# Patient Record
Sex: FEMALE | Race: WHITE | NOT HISPANIC OR LATINO | Employment: UNEMPLOYED | URBAN - NONMETROPOLITAN AREA
[De-identification: names, ages, dates, MRNs, and addresses within clinical notes are randomized per-mention and may not be internally consistent; named-entity substitution may affect disease eponyms.]

---

## 2022-01-08 ENCOUNTER — HOSPITAL ENCOUNTER (OUTPATIENT)
Facility: HOSPITAL | Age: 37
Setting detail: OBSERVATION
Discharge: HOME | End: 2022-01-11
Attending: EMERGENCY MEDICINE | Admitting: STUDENT IN AN ORGANIZED HEALTH CARE EDUCATION/TRAINING PROGRAM

## 2022-01-08 ENCOUNTER — APPOINTMENT (OUTPATIENT)
Dept: GENERAL RADIOLOGY | Facility: HOSPITAL | Age: 37
End: 2022-01-08

## 2022-01-08 DIAGNOSIS — E10.10 DIABETIC KETOACIDOSIS WITHOUT COMA ASSOCIATED WITH TYPE 1 DIABETES MELLITUS: Primary | ICD-10-CM

## 2022-01-08 LAB
A-A DO2: ABNORMAL
ACETONE BLD QL: ABNORMAL
ALBUMIN SERPL-MCNC: 4.09 G/DL (ref 3.5–5.2)
ALBUMIN/GLOB SERPL: 1.4 G/DL
ALP SERPL-CCNC: 135 U/L (ref 39–117)
ALT SERPL W P-5'-P-CCNC: 52 U/L (ref 1–33)
ANION GAP SERPL CALCULATED.3IONS-SCNC: 25.6 MMOL/L (ref 5–15)
ARTERIAL PATENCY WRIST A: ABNORMAL
AST SERPL-CCNC: 29 U/L (ref 1–32)
ATMOSPHERIC PRESS: 732 MMHG
BASE EXCESS BLDA CALC-SCNC: -26.3 MMOL/L (ref 0–2)
BDY SITE: ABNORMAL
BILIRUB SERPL-MCNC: 0.2 MG/DL (ref 0–1.2)
BODY TEMPERATURE: 0 C
BUN SERPL-MCNC: 16 MG/DL (ref 6–20)
BUN/CREAT SERPL: 14.4 (ref 7–25)
CALCIUM SPEC-SCNC: 8 MG/DL (ref 8.6–10.5)
CHLORIDE SERPL-SCNC: 107 MMOL/L (ref 98–107)
CO2 BLDA-SCNC: 3.7 MMOL/L (ref 22–33)
CO2 SERPL-SCNC: 6.4 MMOL/L (ref 22–29)
COHGB MFR BLD: <0.2 % (ref 0–5)
CREAT SERPL-MCNC: 1.11 MG/DL (ref 0.57–1)
CRP SERPL-MCNC: <0.3 MG/DL (ref 0–0.5)
D DIMER PPP FEU-MCNC: 1.96 MCGFEU/ML (ref 0–0.5)
D-LACTATE SERPL-SCNC: 5.2 MMOL/L (ref 0.5–2)
ETHANOL BLD-MCNC: <10 MG/DL (ref 0–10)
ETHANOL UR QL: <0.01 %
FLUAV RNA RESP QL NAA+PROBE: NOT DETECTED
FLUBV RNA RESP QL NAA+PROBE: NOT DETECTED
GFR SERPL CREATININE-BSD FRML MDRD: 56 ML/MIN/1.73
GLOBULIN UR ELPH-MCNC: 2.9 GM/DL
GLUCOSE BLDC GLUCOMTR-MCNC: 113 MG/DL (ref 70–130)
GLUCOSE BLDC GLUCOMTR-MCNC: 372 MG/DL (ref 70–130)
GLUCOSE SERPL-MCNC: 111 MG/DL (ref 65–99)
HCO3 BLDA-SCNC: 3.3 MMOL/L (ref 20–26)
HCT VFR BLD CALC: 46.5 % (ref 38–51)
HGB BLDA-MCNC: 15.2 G/DL (ref 13.5–17.5)
INHALED O2 CONCENTRATION: 21 %
LIPASE SERPL-CCNC: 50 U/L (ref 13–60)
Lab: ABNORMAL
Lab: ABNORMAL
METHGB BLD QL: 0.3 % (ref 0–3)
MODALITY: ABNORMAL
NOTE: ABNORMAL
NOTIFIED BY: ABNORMAL
NOTIFIED WHO: ABNORMAL
NT-PROBNP SERPL-MCNC: 800.8 PG/ML (ref 0–450)
OXYHGB MFR BLDV: 96.5 % (ref 94–99)
PCO2 BLDA: 13.7 MM HG (ref 35–45)
PCO2 TEMP ADJ BLD: ABNORMAL MM[HG]
PH BLDA: 6.99 PH UNITS (ref 7.35–7.45)
PH, TEMP CORRECTED: ABNORMAL
PO2 BLDA: 136 MM HG (ref 83–108)
PO2 TEMP ADJ BLD: ABNORMAL MM[HG]
POTASSIUM SERPL-SCNC: 4.9 MMOL/L (ref 3.5–5.2)
PROT SERPL-MCNC: 7 G/DL (ref 6–8.5)
SAO2 % BLDCOA: 96.7 % (ref 94–99)
SARS-COV-2 RNA RESP QL NAA+PROBE: NOT DETECTED
SODIUM SERPL-SCNC: 139 MMOL/L (ref 136–145)
TROPONIN T SERPL-MCNC: <0.01 NG/ML (ref 0–0.03)
VENTILATOR MODE: ABNORMAL

## 2022-01-08 PROCEDURE — 93005 ELECTROCARDIOGRAM TRACING: CPT | Performed by: STUDENT IN AN ORGANIZED HEALTH CARE EDUCATION/TRAINING PROGRAM

## 2022-01-08 PROCEDURE — 80053 COMPREHEN METABOLIC PANEL: CPT | Performed by: PHYSICIAN ASSISTANT

## 2022-01-08 PROCEDURE — 36600 WITHDRAWAL OF ARTERIAL BLOOD: CPT

## 2022-01-08 PROCEDURE — 82375 ASSAY CARBOXYHB QUANT: CPT

## 2022-01-08 PROCEDURE — 84484 ASSAY OF TROPONIN QUANT: CPT | Performed by: PHYSICIAN ASSISTANT

## 2022-01-08 PROCEDURE — C9803 HOPD COVID-19 SPEC COLLECT: HCPCS

## 2022-01-08 PROCEDURE — 99284 EMERGENCY DEPT VISIT MOD MDM: CPT

## 2022-01-08 PROCEDURE — 96375 TX/PRO/DX INJ NEW DRUG ADDON: CPT

## 2022-01-08 PROCEDURE — 83605 ASSAY OF LACTIC ACID: CPT | Performed by: PHYSICIAN ASSISTANT

## 2022-01-08 PROCEDURE — G0378 HOSPITAL OBSERVATION PER HR: HCPCS

## 2022-01-08 PROCEDURE — 85379 FIBRIN DEGRADATION QUANT: CPT | Performed by: PHYSICIAN ASSISTANT

## 2022-01-08 PROCEDURE — 25010000002 ONDANSETRON PER 1 MG: Performed by: PHYSICIAN ASSISTANT

## 2022-01-08 PROCEDURE — 82009 KETONE BODYS QUAL: CPT | Performed by: PHYSICIAN ASSISTANT

## 2022-01-08 PROCEDURE — 87040 BLOOD CULTURE FOR BACTERIA: CPT | Performed by: PHYSICIAN ASSISTANT

## 2022-01-08 PROCEDURE — 96367 TX/PROPH/DG ADDL SEQ IV INF: CPT

## 2022-01-08 PROCEDURE — 86140 C-REACTIVE PROTEIN: CPT | Performed by: PHYSICIAN ASSISTANT

## 2022-01-08 PROCEDURE — 87636 SARSCOV2 & INF A&B AMP PRB: CPT | Performed by: PHYSICIAN ASSISTANT

## 2022-01-08 PROCEDURE — 83880 ASSAY OF NATRIURETIC PEPTIDE: CPT | Performed by: PHYSICIAN ASSISTANT

## 2022-01-08 PROCEDURE — 93005 ELECTROCARDIOGRAM TRACING: CPT | Performed by: PHYSICIAN ASSISTANT

## 2022-01-08 PROCEDURE — 25010000002 CEFTRIAXONE PER 250 MG: Performed by: EMERGENCY MEDICINE

## 2022-01-08 PROCEDURE — 83050 HGB METHEMOGLOBIN QUAN: CPT

## 2022-01-08 PROCEDURE — 96365 THER/PROPH/DIAG IV INF INIT: CPT

## 2022-01-08 PROCEDURE — 82077 ASSAY SPEC XCP UR&BREATH IA: CPT | Performed by: PHYSICIAN ASSISTANT

## 2022-01-08 PROCEDURE — 25010000002 KETOROLAC TROMETHAMINE PER 15 MG: Performed by: PHYSICIAN ASSISTANT

## 2022-01-08 PROCEDURE — 93010 ELECTROCARDIOGRAM REPORT: CPT | Performed by: INTERNAL MEDICINE

## 2022-01-08 PROCEDURE — 71045 X-RAY EXAM CHEST 1 VIEW: CPT

## 2022-01-08 PROCEDURE — 82805 BLOOD GASES W/O2 SATURATION: CPT

## 2022-01-08 PROCEDURE — 83690 ASSAY OF LIPASE: CPT | Performed by: PHYSICIAN ASSISTANT

## 2022-01-08 PROCEDURE — 36415 COLL VENOUS BLD VENIPUNCTURE: CPT

## 2022-01-08 PROCEDURE — 84100 ASSAY OF PHOSPHORUS: CPT | Performed by: EMERGENCY MEDICINE

## 2022-01-08 PROCEDURE — 83735 ASSAY OF MAGNESIUM: CPT | Performed by: EMERGENCY MEDICINE

## 2022-01-08 PROCEDURE — 82962 GLUCOSE BLOOD TEST: CPT

## 2022-01-08 RX ORDER — KETOROLAC TROMETHAMINE 30 MG/ML
30 INJECTION, SOLUTION INTRAMUSCULAR; INTRAVENOUS ONCE
Status: COMPLETED | OUTPATIENT
Start: 2022-01-08 | End: 2022-01-08

## 2022-01-08 RX ORDER — SODIUM CHLORIDE 9 MG/ML
30 INJECTION, SOLUTION INTRAVENOUS CONTINUOUS PRN
Status: DISCONTINUED | OUTPATIENT
Start: 2022-01-08 | End: 2022-01-11

## 2022-01-08 RX ORDER — SODIUM CHLORIDE 0.9 % (FLUSH) 0.9 %
30 SYRINGE (ML) INJECTION ONCE AS NEEDED
Status: DISCONTINUED | OUTPATIENT
Start: 2022-01-08 | End: 2022-01-11 | Stop reason: HOSPADM

## 2022-01-08 RX ORDER — SODIUM CHLORIDE 0.9 % (FLUSH) 0.9 %
10 SYRINGE (ML) INJECTION AS NEEDED
Status: DISCONTINUED | OUTPATIENT
Start: 2022-01-08 | End: 2022-01-11 | Stop reason: HOSPADM

## 2022-01-08 RX ORDER — ONDANSETRON 2 MG/ML
4 INJECTION INTRAMUSCULAR; INTRAVENOUS ONCE
Status: COMPLETED | OUTPATIENT
Start: 2022-01-08 | End: 2022-01-08

## 2022-01-08 RX ADMIN — SODIUM CHLORIDE 1000 ML: 9 INJECTION, SOLUTION INTRAVENOUS at 21:22

## 2022-01-08 RX ADMIN — KETOROLAC TROMETHAMINE 30 MG: 30 INJECTION, SOLUTION INTRAMUSCULAR at 21:23

## 2022-01-08 RX ADMIN — CEFTRIAXONE SODIUM 1 G: 1 INJECTION, POWDER, FOR SOLUTION INTRAMUSCULAR; INTRAVENOUS at 23:25

## 2022-01-08 RX ADMIN — SODIUM CHLORIDE 1000 ML: 9 INJECTION, SOLUTION INTRAVENOUS at 22:08

## 2022-01-08 RX ADMIN — INSULIN HUMAN 5 UNITS/HR: 1 INJECTION, SOLUTION INTRAVENOUS at 22:07

## 2022-01-08 RX ADMIN — ONDANSETRON 4 MG: 2 INJECTION INTRAMUSCULAR; INTRAVENOUS at 21:23

## 2022-01-09 ENCOUNTER — APPOINTMENT (OUTPATIENT)
Dept: CT IMAGING | Facility: HOSPITAL | Age: 37
End: 2022-01-09

## 2022-01-09 LAB
ANION GAP SERPL CALCULATED.3IONS-SCNC: 14.3 MMOL/L (ref 5–15)
ANION GAP SERPL CALCULATED.3IONS-SCNC: 15.3 MMOL/L (ref 5–15)
ANION GAP SERPL CALCULATED.3IONS-SCNC: 16.1 MMOL/L (ref 5–15)
ANION GAP SERPL CALCULATED.3IONS-SCNC: 18.9 MMOL/L (ref 5–15)
ANION GAP SERPL CALCULATED.3IONS-SCNC: 21.5 MMOL/L (ref 5–15)
ATMOSPHERIC PRESS: 729 MMHG
ATMOSPHERIC PRESS: 735 MMHG
B-HCG UR QL: NEGATIVE
BACTERIA UR QL AUTO: ABNORMAL /HPF
BASE EXCESS BLDV CALC-SCNC: -12.4 MMOL/L (ref 0–2)
BASE EXCESS BLDV CALC-SCNC: -13.4 MMOL/L (ref 0–2)
BASOPHILS # BLD AUTO: 0.02 10*3/MM3 (ref 0–0.2)
BASOPHILS # BLD AUTO: 0.03 10*3/MM3 (ref 0–0.2)
BASOPHILS NFR BLD AUTO: 0.1 % (ref 0–1.5)
BASOPHILS NFR BLD AUTO: 0.3 % (ref 0–1.5)
BDY SITE: ABNORMAL
BDY SITE: ABNORMAL
BILIRUB UR QL STRIP: NEGATIVE
BODY TEMPERATURE: 37 C
BODY TEMPERATURE: 37 C
BUN SERPL-MCNC: 10 MG/DL (ref 6–20)
BUN SERPL-MCNC: 12 MG/DL (ref 6–20)
BUN SERPL-MCNC: 4 MG/DL (ref 6–20)
BUN SERPL-MCNC: 6 MG/DL (ref 6–20)
BUN SERPL-MCNC: 8 MG/DL (ref 6–20)
BUN/CREAT SERPL: 12.3 (ref 7–25)
BUN/CREAT SERPL: 15.6 (ref 7–25)
BUN/CREAT SERPL: 5.6 (ref 7–25)
BUN/CREAT SERPL: 7 (ref 7–25)
BUN/CREAT SERPL: 8.4 (ref 7–25)
CALCIUM SPEC-SCNC: 6.7 MG/DL (ref 8.6–10.5)
CALCIUM SPEC-SCNC: 7.4 MG/DL (ref 8.6–10.5)
CALCIUM SPEC-SCNC: 7.5 MG/DL (ref 8.6–10.5)
CALCIUM SPEC-SCNC: 7.7 MG/DL (ref 8.6–10.5)
CALCIUM SPEC-SCNC: 7.8 MG/DL (ref 8.6–10.5)
CHLORIDE SERPL-SCNC: 106 MMOL/L (ref 98–107)
CHLORIDE SERPL-SCNC: 111 MMOL/L (ref 98–107)
CHLORIDE SERPL-SCNC: 113 MMOL/L (ref 98–107)
CLARITY UR: CLEAR
CO2 BLDA-SCNC: 14.2 MMOL/L (ref 22–33)
CO2 BLDA-SCNC: 14.3 MMOL/L (ref 22–33)
CO2 SERPL-SCNC: 11.7 MMOL/L (ref 22–29)
CO2 SERPL-SCNC: 11.7 MMOL/L (ref 22–29)
CO2 SERPL-SCNC: 8.5 MMOL/L (ref 22–29)
CO2 SERPL-SCNC: 9.1 MMOL/L (ref 22–29)
CO2 SERPL-SCNC: 9.9 MMOL/L (ref 22–29)
COHGB MFR BLD: 0.4 % (ref 0–5)
COHGB MFR BLD: 1 % (ref 0–5)
COLOR UR: YELLOW
CREAT SERPL-MCNC: 0.71 MG/DL (ref 0.57–1)
CREAT SERPL-MCNC: 0.77 MG/DL (ref 0.57–1)
CREAT SERPL-MCNC: 0.81 MG/DL (ref 0.57–1)
CREAT SERPL-MCNC: 0.86 MG/DL (ref 0.57–1)
CREAT SERPL-MCNC: 0.95 MG/DL (ref 0.57–1)
D-LACTATE SERPL-SCNC: 2.1 MMOL/L (ref 0.5–2)
D-LACTATE SERPL-SCNC: 2.9 MMOL/L (ref 0.5–2)
DEPRECATED RDW RBC AUTO: 52.5 FL (ref 37–54)
DEPRECATED RDW RBC AUTO: 55.9 FL (ref 37–54)
EOSINOPHIL # BLD AUTO: 0 10*3/MM3 (ref 0–0.4)
EOSINOPHIL # BLD AUTO: 0.01 10*3/MM3 (ref 0–0.4)
EOSINOPHIL NFR BLD AUTO: 0 % (ref 0.3–6.2)
EOSINOPHIL NFR BLD AUTO: 0.1 % (ref 0.3–6.2)
ERYTHROCYTE [DISTWIDTH] IN BLOOD BY AUTOMATED COUNT: 14.2 % (ref 12.3–15.4)
ERYTHROCYTE [DISTWIDTH] IN BLOOD BY AUTOMATED COUNT: 14.3 % (ref 12.3–15.4)
GFR SERPL CREATININE-BSD FRML MDRD: 67 ML/MIN/1.73
GFR SERPL CREATININE-BSD FRML MDRD: 75 ML/MIN/1.73
GFR SERPL CREATININE-BSD FRML MDRD: 80 ML/MIN/1.73
GFR SERPL CREATININE-BSD FRML MDRD: 85 ML/MIN/1.73
GFR SERPL CREATININE-BSD FRML MDRD: 94 ML/MIN/1.73
GLUCOSE BLDC GLUCOMTR-MCNC: 105 MG/DL (ref 70–130)
GLUCOSE BLDC GLUCOMTR-MCNC: 117 MG/DL (ref 70–130)
GLUCOSE BLDC GLUCOMTR-MCNC: 125 MG/DL (ref 70–130)
GLUCOSE BLDC GLUCOMTR-MCNC: 128 MG/DL (ref 70–130)
GLUCOSE BLDC GLUCOMTR-MCNC: 128 MG/DL (ref 70–130)
GLUCOSE BLDC GLUCOMTR-MCNC: 137 MG/DL (ref 70–130)
GLUCOSE BLDC GLUCOMTR-MCNC: 154 MG/DL (ref 70–130)
GLUCOSE BLDC GLUCOMTR-MCNC: 155 MG/DL (ref 70–130)
GLUCOSE BLDC GLUCOMTR-MCNC: 165 MG/DL (ref 70–130)
GLUCOSE BLDC GLUCOMTR-MCNC: 166 MG/DL (ref 70–130)
GLUCOSE BLDC GLUCOMTR-MCNC: 170 MG/DL (ref 70–130)
GLUCOSE BLDC GLUCOMTR-MCNC: 175 MG/DL (ref 70–130)
GLUCOSE BLDC GLUCOMTR-MCNC: 221 MG/DL (ref 70–130)
GLUCOSE BLDC GLUCOMTR-MCNC: 246 MG/DL (ref 70–130)
GLUCOSE BLDC GLUCOMTR-MCNC: 253 MG/DL (ref 70–130)
GLUCOSE BLDC GLUCOMTR-MCNC: 280 MG/DL (ref 70–130)
GLUCOSE BLDC GLUCOMTR-MCNC: 281 MG/DL (ref 70–130)
GLUCOSE BLDC GLUCOMTR-MCNC: 283 MG/DL (ref 70–130)
GLUCOSE BLDC GLUCOMTR-MCNC: 289 MG/DL (ref 70–130)
GLUCOSE BLDC GLUCOMTR-MCNC: 64 MG/DL (ref 70–130)
GLUCOSE BLDC GLUCOMTR-MCNC: 86 MG/DL (ref 70–130)
GLUCOSE BLDC GLUCOMTR-MCNC: 89 MG/DL (ref 70–130)
GLUCOSE BLDC GLUCOMTR-MCNC: 95 MG/DL (ref 70–130)
GLUCOSE BLDC GLUCOMTR-MCNC: 98 MG/DL (ref 70–130)
GLUCOSE SERPL-MCNC: 147 MG/DL (ref 65–99)
GLUCOSE SERPL-MCNC: 188 MG/DL (ref 65–99)
GLUCOSE SERPL-MCNC: 221 MG/DL (ref 65–99)
GLUCOSE SERPL-MCNC: 242 MG/DL (ref 65–99)
GLUCOSE SERPL-MCNC: 268 MG/DL (ref 65–99)
GLUCOSE UR STRIP-MCNC: ABNORMAL MG/DL
HCO3 BLDV-SCNC: 13.2 MMOL/L (ref 22–28)
HCO3 BLDV-SCNC: 13.3 MMOL/L (ref 22–28)
HCT VFR BLD AUTO: 34.9 % (ref 34–46.6)
HCT VFR BLD AUTO: 35.9 % (ref 34–46.6)
HGB BLD-MCNC: 11 G/DL (ref 12–15.9)
HGB BLD-MCNC: 11.3 G/DL (ref 12–15.9)
HGB BLDA-MCNC: 11 G/DL (ref 13.5–17.5)
HGB BLDA-MCNC: 11.9 G/DL (ref 13.5–17.5)
HGB UR QL STRIP.AUTO: ABNORMAL
HOLD SPECIMEN: NORMAL
HOLD SPECIMEN: NORMAL
HYALINE CASTS UR QL AUTO: ABNORMAL /LPF
IMM GRANULOCYTES # BLD AUTO: 0.04 10*3/MM3 (ref 0–0.05)
IMM GRANULOCYTES # BLD AUTO: 0.09 10*3/MM3 (ref 0–0.05)
IMM GRANULOCYTES NFR BLD AUTO: 0.4 % (ref 0–0.5)
IMM GRANULOCYTES NFR BLD AUTO: 0.6 % (ref 0–0.5)
INHALED O2 CONCENTRATION: 21 %
INHALED O2 CONCENTRATION: 21 %
KETONES UR QL STRIP: ABNORMAL
LEUKOCYTE ESTERASE UR QL STRIP.AUTO: NEGATIVE
LYMPHOCYTES # BLD AUTO: 1.85 10*3/MM3 (ref 0.7–3.1)
LYMPHOCYTES # BLD AUTO: 2.27 10*3/MM3 (ref 0.7–3.1)
LYMPHOCYTES NFR BLD AUTO: 12.5 % (ref 19.6–45.3)
LYMPHOCYTES NFR BLD AUTO: 21.4 % (ref 19.6–45.3)
Lab: ABNORMAL
MAGNESIUM SERPL-MCNC: 1.5 MG/DL (ref 1.6–2.6)
MAGNESIUM SERPL-MCNC: 1.6 MG/DL (ref 1.6–2.6)
MAGNESIUM SERPL-MCNC: 1.9 MG/DL (ref 1.6–2.6)
MAGNESIUM SERPL-MCNC: 2 MG/DL (ref 1.6–2.6)
MAGNESIUM SERPL-MCNC: 2.1 MG/DL (ref 1.6–2.6)
MAGNESIUM SERPL-MCNC: 2.2 MG/DL (ref 1.6–2.6)
MCH RBC QN AUTO: 32.3 PG (ref 26.6–33)
MCH RBC QN AUTO: 32.4 PG (ref 26.6–33)
MCHC RBC AUTO-ENTMCNC: 30.6 G/DL (ref 31.5–35.7)
MCHC RBC AUTO-ENTMCNC: 32.4 G/DL (ref 31.5–35.7)
MCV RBC AUTO: 105.6 FL (ref 79–97)
MCV RBC AUTO: 99.7 FL (ref 79–97)
METHGB BLD QL: 0.5 % (ref 0–3)
METHGB BLD QL: 0.9 % (ref 0–3)
MODALITY: ABNORMAL
MODALITY: ABNORMAL
MONOCYTES # BLD AUTO: 0.86 10*3/MM3 (ref 0.1–0.9)
MONOCYTES # BLD AUTO: 1.12 10*3/MM3 (ref 0.1–0.9)
MONOCYTES NFR BLD AUTO: 7.6 % (ref 5–12)
MONOCYTES NFR BLD AUTO: 8.1 % (ref 5–12)
NEUTROPHILS NFR BLD AUTO: 11.71 10*3/MM3 (ref 1.7–7)
NEUTROPHILS NFR BLD AUTO: 69.7 % (ref 42.7–76)
NEUTROPHILS NFR BLD AUTO: 7.39 10*3/MM3 (ref 1.7–7)
NEUTROPHILS NFR BLD AUTO: 79.2 % (ref 42.7–76)
NITRITE UR QL STRIP: NEGATIVE
NOTIFIED BY: ABNORMAL
NOTIFIED WHO: ABNORMAL
NRBC BLD AUTO-RTO: 0 /100 WBC (ref 0–0.2)
NRBC BLD AUTO-RTO: 0 /100 WBC (ref 0–0.2)
OXYHGB MFR BLDV: 68.4 % (ref 45–75)
OXYHGB MFR BLDV: 69.7 % (ref 45–75)
PCO2 BLDV: 29.7 MM HG (ref 41–51)
PCO2 BLDV: 32.3 MM HG (ref 41–51)
PH BLDV: 7.22 PH UNITS (ref 7.32–7.42)
PH BLDV: 7.26 PH UNITS (ref 7.32–7.42)
PH UR STRIP.AUTO: <=5 [PH] (ref 5–8)
PHOSPHATE SERPL-MCNC: 1.2 MG/DL (ref 2.5–4.5)
PHOSPHATE SERPL-MCNC: 1.3 MG/DL (ref 2.5–4.5)
PHOSPHATE SERPL-MCNC: 1.4 MG/DL (ref 2.5–4.5)
PHOSPHATE SERPL-MCNC: 1.7 MG/DL (ref 2.5–4.5)
PHOSPHATE SERPL-MCNC: 2.2 MG/DL (ref 2.5–4.5)
PHOSPHATE SERPL-MCNC: 2.7 MG/DL (ref 2.5–4.5)
PLATELET # BLD AUTO: 352 10*3/MM3 (ref 140–450)
PLATELET # BLD AUTO: 362 10*3/MM3 (ref 140–450)
PMV BLD AUTO: 9.4 FL (ref 6–12)
PMV BLD AUTO: 9.7 FL (ref 6–12)
PO2 BLDV: 33.7 MM HG (ref 27–53)
PO2 BLDV: 34.8 MM HG (ref 27–53)
POTASSIUM SERPL-SCNC: 4 MMOL/L (ref 3.5–5.2)
POTASSIUM SERPL-SCNC: 4 MMOL/L (ref 3.5–5.2)
POTASSIUM SERPL-SCNC: 4.1 MMOL/L (ref 3.5–5.2)
POTASSIUM SERPL-SCNC: 4.2 MMOL/L (ref 3.5–5.2)
POTASSIUM SERPL-SCNC: 4.6 MMOL/L (ref 3.5–5.2)
PROT UR QL STRIP: ABNORMAL
RBC # BLD AUTO: 3.4 10*6/MM3 (ref 3.77–5.28)
RBC # BLD AUTO: 3.5 10*6/MM3 (ref 3.77–5.28)
RBC # UR STRIP: ABNORMAL /HPF
REF LAB TEST METHOD: ABNORMAL
SAO2 % BLDCOV: 69.7 % (ref 45–75)
SAO2 % BLDCOV: 70.3 % (ref 45–75)
SODIUM SERPL-SCNC: 136 MMOL/L (ref 136–145)
SODIUM SERPL-SCNC: 137 MMOL/L (ref 136–145)
SODIUM SERPL-SCNC: 138 MMOL/L (ref 136–145)
SODIUM SERPL-SCNC: 139 MMOL/L (ref 136–145)
SODIUM SERPL-SCNC: 139 MMOL/L (ref 136–145)
SP GR UR STRIP: 1.02 (ref 1–1.03)
SQUAMOUS #/AREA URNS HPF: ABNORMAL /HPF
UROBILINOGEN UR QL STRIP: ABNORMAL
VENTILATOR MODE: ABNORMAL
VENTILATOR MODE: ABNORMAL
WBC # UR STRIP: ABNORMAL /HPF
WBC NRBC COR # BLD: 10.6 10*3/MM3 (ref 3.4–10.8)
WBC NRBC COR # BLD: 14.79 10*3/MM3 (ref 3.4–10.8)
WHOLE BLOOD HOLD SPECIMEN: NORMAL
WHOLE BLOOD HOLD SPECIMEN: NORMAL

## 2022-01-09 PROCEDURE — 83735 ASSAY OF MAGNESIUM: CPT | Performed by: EMERGENCY MEDICINE

## 2022-01-09 PROCEDURE — 74177 CT ABD & PELVIS W/CONTRAST: CPT

## 2022-01-09 PROCEDURE — 82820 HEMOGLOBIN-OXYGEN AFFINITY: CPT

## 2022-01-09 PROCEDURE — 82805 BLOOD GASES W/O2 SATURATION: CPT

## 2022-01-09 PROCEDURE — 96366 THER/PROPH/DIAG IV INF ADDON: CPT

## 2022-01-09 PROCEDURE — 71275 CT ANGIOGRAPHY CHEST: CPT

## 2022-01-09 PROCEDURE — 85025 COMPLETE CBC W/AUTO DIFF WBC: CPT | Performed by: PHYSICIAN ASSISTANT

## 2022-01-09 PROCEDURE — 80048 BASIC METABOLIC PNL TOTAL CA: CPT | Performed by: EMERGENCY MEDICINE

## 2022-01-09 PROCEDURE — 82962 GLUCOSE BLOOD TEST: CPT

## 2022-01-09 PROCEDURE — 63710000001 INSULIN DETEMIR PER 5 UNITS: Performed by: EMERGENCY MEDICINE

## 2022-01-09 PROCEDURE — 25010000002 KETOROLAC TROMETHAMINE PER 15 MG: Performed by: EMERGENCY MEDICINE

## 2022-01-09 PROCEDURE — 96376 TX/PRO/DX INJ SAME DRUG ADON: CPT

## 2022-01-09 PROCEDURE — 81001 URINALYSIS AUTO W/SCOPE: CPT | Performed by: PHYSICIAN ASSISTANT

## 2022-01-09 PROCEDURE — 83605 ASSAY OF LACTIC ACID: CPT | Performed by: PHYSICIAN ASSISTANT

## 2022-01-09 PROCEDURE — 25010000002 ONDANSETRON PER 1 MG: Performed by: EMERGENCY MEDICINE

## 2022-01-09 PROCEDURE — 0 IOPAMIDOL PER 1 ML: Performed by: EMERGENCY MEDICINE

## 2022-01-09 PROCEDURE — 25010000002 CEFTRIAXONE PER 250 MG: Performed by: EMERGENCY MEDICINE

## 2022-01-09 PROCEDURE — 84100 ASSAY OF PHOSPHORUS: CPT | Performed by: EMERGENCY MEDICINE

## 2022-01-09 PROCEDURE — 85025 COMPLETE CBC W/AUTO DIFF WBC: CPT | Performed by: EMERGENCY MEDICINE

## 2022-01-09 PROCEDURE — 81025 URINE PREGNANCY TEST: CPT | Performed by: PHYSICIAN ASSISTANT

## 2022-01-09 PROCEDURE — 96368 THER/DIAG CONCURRENT INF: CPT

## 2022-01-09 PROCEDURE — 25010000002 MAGNESIUM SULFATE 2 GM/50ML SOLUTION: Performed by: EMERGENCY MEDICINE

## 2022-01-09 PROCEDURE — 96361 HYDRATE IV INFUSION ADD-ON: CPT

## 2022-01-09 PROCEDURE — 83605 ASSAY OF LACTIC ACID: CPT | Performed by: EMERGENCY MEDICINE

## 2022-01-09 PROCEDURE — 63710000001 INSULIN REGULAR HUMAN PER 5 UNITS: Performed by: EMERGENCY MEDICINE

## 2022-01-09 RX ORDER — SODIUM BICARBONATE 650 MG/1
2600 TABLET ORAL ONCE
Status: COMPLETED | OUTPATIENT
Start: 2022-01-09 | End: 2022-01-09

## 2022-01-09 RX ORDER — DEXTROSE AND SODIUM CHLORIDE 5; .9 G/100ML; G/100ML
200 INJECTION, SOLUTION INTRAVENOUS CONTINUOUS
Status: DISCONTINUED | OUTPATIENT
Start: 2022-01-09 | End: 2022-01-11

## 2022-01-09 RX ORDER — ATORVASTATIN CALCIUM 10 MG/1
10 TABLET, FILM COATED ORAL DAILY
COMMUNITY

## 2022-01-09 RX ORDER — DEXTROSE, SODIUM CHLORIDE, AND POTASSIUM CHLORIDE 5; .45; .15 G/100ML; G/100ML; G/100ML
150 INJECTION INTRAVENOUS CONTINUOUS PRN
Status: DISCONTINUED | OUTPATIENT
Start: 2022-01-09 | End: 2022-01-11

## 2022-01-09 RX ORDER — LEVOTHYROXINE SODIUM 0.12 MG/1
125 TABLET ORAL ONCE
Status: DISCONTINUED | OUTPATIENT
Start: 2022-01-09 | End: 2022-01-09

## 2022-01-09 RX ORDER — SODIUM CHLORIDE 0.9 % (FLUSH) 0.9 %
10 SYRINGE (ML) INJECTION ONCE AS NEEDED
Status: DISCONTINUED | OUTPATIENT
Start: 2022-01-09 | End: 2022-01-11 | Stop reason: HOSPADM

## 2022-01-09 RX ORDER — MAGNESIUM SULFATE HEPTAHYDRATE 40 MG/ML
2 INJECTION, SOLUTION INTRAVENOUS ONCE
Status: COMPLETED | OUTPATIENT
Start: 2022-01-09 | End: 2022-01-09

## 2022-01-09 RX ORDER — SODIUM CHLORIDE 0.9 % (FLUSH) 0.9 %
3 SYRINGE (ML) INJECTION EVERY 12 HOURS SCHEDULED
Status: DISCONTINUED | OUTPATIENT
Start: 2022-01-09 | End: 2022-01-11 | Stop reason: HOSPADM

## 2022-01-09 RX ORDER — KETOROLAC TROMETHAMINE 30 MG/ML
30 INJECTION, SOLUTION INTRAMUSCULAR; INTRAVENOUS ONCE
Status: COMPLETED | OUTPATIENT
Start: 2022-01-09 | End: 2022-01-09

## 2022-01-09 RX ORDER — SODIUM CHLORIDE 9 MG/ML
10 INJECTION, SOLUTION INTRAVENOUS CONTINUOUS PRN
Status: DISCONTINUED | OUTPATIENT
Start: 2022-01-09 | End: 2022-01-11

## 2022-01-09 RX ORDER — DEXTROSE MONOHYDRATE 25 G/50ML
25 INJECTION, SOLUTION INTRAVENOUS ONCE
Status: COMPLETED | OUTPATIENT
Start: 2022-01-09 | End: 2022-01-09

## 2022-01-09 RX ORDER — ONDANSETRON 2 MG/ML
4 INJECTION INTRAMUSCULAR; INTRAVENOUS ONCE
Status: COMPLETED | OUTPATIENT
Start: 2022-01-09 | End: 2022-01-09

## 2022-01-09 RX ORDER — SODIUM CHLORIDE 0.9 % (FLUSH) 0.9 %
10 SYRINGE (ML) INJECTION AS NEEDED
Status: DISCONTINUED | OUTPATIENT
Start: 2022-01-09 | End: 2022-01-11 | Stop reason: HOSPADM

## 2022-01-09 RX ORDER — NICOTINE POLACRILEX 4 MG
15 LOZENGE BUCCAL
Status: DISCONTINUED | OUTPATIENT
Start: 2022-01-09 | End: 2022-01-11 | Stop reason: HOSPADM

## 2022-01-09 RX ORDER — INSULIN GLARGINE 100 [IU]/ML
25 INJECTION, SOLUTION SUBCUTANEOUS DAILY
Status: ON HOLD | COMMUNITY
End: 2022-01-20 | Stop reason: SDUPTHER

## 2022-01-09 RX ORDER — ATORVASTATIN CALCIUM 10 MG/1
10 TABLET, FILM COATED ORAL DAILY
Status: CANCELLED | OUTPATIENT
Start: 2022-01-09

## 2022-01-09 RX ORDER — DEXTROSE MONOHYDRATE 25 G/50ML
25 INJECTION, SOLUTION INTRAVENOUS
Status: DISCONTINUED | OUTPATIENT
Start: 2022-01-09 | End: 2022-01-11 | Stop reason: HOSPADM

## 2022-01-09 RX ORDER — DEXTROSE MONOHYDRATE 25 G/50ML
12.5 INJECTION, SOLUTION INTRAVENOUS AS NEEDED
Status: DISCONTINUED | OUTPATIENT
Start: 2022-01-09 | End: 2022-01-11 | Stop reason: HOSPADM

## 2022-01-09 RX ADMIN — DEXTROSE MONOHYDRATE 12.5 G: 25 INJECTION, SOLUTION INTRAVENOUS at 01:36

## 2022-01-09 RX ADMIN — DEXTROSE MONOHYDRATE 12.5 G: 25 INJECTION, SOLUTION INTRAVENOUS at 07:42

## 2022-01-09 RX ADMIN — INSULIN HUMAN 2 UNITS/HR: 1 INJECTION, SOLUTION INTRAVENOUS at 01:35

## 2022-01-09 RX ADMIN — CEFTRIAXONE 1 G: 1 INJECTION, POWDER, FOR SOLUTION INTRAMUSCULAR; INTRAVENOUS at 08:26

## 2022-01-09 RX ADMIN — MAGNESIUM SULFATE 2 G: 2 INJECTION INTRAVENOUS at 05:51

## 2022-01-09 RX ADMIN — DEXTROSE AND SODIUM CHLORIDE 200 ML/HR: 5; 900 INJECTION, SOLUTION INTRAVENOUS at 18:20

## 2022-01-09 RX ADMIN — DEXTROSE MONOHYDRATE 25 G: 25 INJECTION, SOLUTION INTRAVENOUS at 18:46

## 2022-01-09 RX ADMIN — POTASSIUM CHLORIDE, DEXTROSE MONOHYDRATE AND SODIUM CHLORIDE 150 ML/HR: 150; 5; 450 INJECTION, SOLUTION INTRAVENOUS at 00:57

## 2022-01-09 RX ADMIN — HUMAN INSULIN 3 UNITS: 100 INJECTION, SOLUTION SUBCUTANEOUS at 13:10

## 2022-01-09 RX ADMIN — SODIUM BICARBONATE TAB 650 MG 2600 MG: 650 TAB at 21:47

## 2022-01-09 RX ADMIN — KETOROLAC TROMETHAMINE 30 MG: 30 INJECTION, SOLUTION INTRAMUSCULAR at 04:20

## 2022-01-09 RX ADMIN — DEXTROSE MONOHYDRATE 12.5 G: 25 INJECTION, SOLUTION INTRAVENOUS at 02:32

## 2022-01-09 RX ADMIN — DEXTROSE AND SODIUM CHLORIDE 200 ML/HR: 5; 900 INJECTION, SOLUTION INTRAVENOUS at 13:10

## 2022-01-09 RX ADMIN — SODIUM CHLORIDE 2000 ML: 9 INJECTION, SOLUTION INTRAVENOUS at 00:56

## 2022-01-09 RX ADMIN — IOPAMIDOL 80 ML: 755 INJECTION, SOLUTION INTRAVENOUS at 01:31

## 2022-01-09 RX ADMIN — DEXTROSE MONOHYDRATE 25 G: 25 INJECTION, SOLUTION INTRAVENOUS at 15:20

## 2022-01-09 RX ADMIN — POTASSIUM CHLORIDE, DEXTROSE MONOHYDRATE AND SODIUM CHLORIDE 150 ML/HR: 150; 5; 450 INJECTION, SOLUTION INTRAVENOUS at 09:04

## 2022-01-09 RX ADMIN — ONDANSETRON 4 MG: 2 INJECTION INTRAMUSCULAR; INTRAVENOUS at 04:20

## 2022-01-09 RX ADMIN — INSULIN DETEMIR 15 UNITS: 100 INJECTION, SOLUTION SUBCUTANEOUS at 21:58

## 2022-01-09 RX ADMIN — SODIUM CHLORIDE, PRESERVATIVE FREE 3 ML: 5 INJECTION INTRAVENOUS at 20:15

## 2022-01-09 RX ADMIN — SODIUM PHOSPHATE, MONOBASIC, MONOHYDRATE AND SODIUM PHOSPHATE, DIBASIC, ANHYDROUS 30 MMOL: 276; 142 INJECTION, SOLUTION INTRAVENOUS at 20:14

## 2022-01-10 PROBLEM — E87.20 METABOLIC ACIDOSIS: Status: ACTIVE | Noted: 2022-01-10

## 2022-01-10 LAB
ACETONE BLD QL: NEGATIVE
ANION GAP SERPL CALCULATED.3IONS-SCNC: 12.8 MMOL/L (ref 5–15)
ANION GAP SERPL CALCULATED.3IONS-SCNC: 13.4 MMOL/L (ref 5–15)
ANION GAP SERPL CALCULATED.3IONS-SCNC: 14.5 MMOL/L (ref 5–15)
ATMOSPHERIC PRESS: 737 MMHG
B-HCG UR QL: NEGATIVE
BASE EXCESS BLDV CALC-SCNC: -5.5 MMOL/L (ref 0–2)
BDY SITE: ABNORMAL
BODY TEMPERATURE: 37 C
BUN SERPL-MCNC: 4 MG/DL (ref 6–20)
BUN SERPL-MCNC: 4 MG/DL (ref 6–20)
BUN SERPL-MCNC: 5 MG/DL (ref 6–20)
BUN/CREAT SERPL: 5.1 (ref 7–25)
BUN/CREAT SERPL: 6.1 (ref 7–25)
BUN/CREAT SERPL: 8.1 (ref 7–25)
CALCIUM SPEC-SCNC: 8 MG/DL (ref 8.6–10.5)
CALCIUM SPEC-SCNC: 8.1 MG/DL (ref 8.6–10.5)
CALCIUM SPEC-SCNC: 8.6 MG/DL (ref 8.6–10.5)
CHLORIDE SERPL-SCNC: 105 MMOL/L (ref 98–107)
CHLORIDE SERPL-SCNC: 113 MMOL/L (ref 98–107)
CHLORIDE SERPL-SCNC: 113 MMOL/L (ref 98–107)
CO2 BLDA-SCNC: 22.3 MMOL/L (ref 22–33)
CO2 SERPL-SCNC: 15.2 MMOL/L (ref 22–29)
CO2 SERPL-SCNC: 15.5 MMOL/L (ref 22–29)
CO2 SERPL-SCNC: 15.6 MMOL/L (ref 22–29)
COHGB MFR BLD: 0.9 % (ref 0–5)
CREAT SERPL-MCNC: 0.62 MG/DL (ref 0.57–1)
CREAT SERPL-MCNC: 0.66 MG/DL (ref 0.57–1)
CREAT SERPL-MCNC: 0.78 MG/DL (ref 0.57–1)
GFR SERPL CREATININE-BSD FRML MDRD: 102 ML/MIN/1.73
GFR SERPL CREATININE-BSD FRML MDRD: 110 ML/MIN/1.73
GFR SERPL CREATININE-BSD FRML MDRD: 84 ML/MIN/1.73
GLUCOSE BLDC GLUCOMTR-MCNC: 114 MG/DL (ref 70–130)
GLUCOSE BLDC GLUCOMTR-MCNC: 122 MG/DL (ref 70–130)
GLUCOSE BLDC GLUCOMTR-MCNC: 133 MG/DL (ref 70–130)
GLUCOSE BLDC GLUCOMTR-MCNC: 240 MG/DL (ref 70–130)
GLUCOSE BLDC GLUCOMTR-MCNC: 390 MG/DL (ref 70–130)
GLUCOSE BLDC GLUCOMTR-MCNC: 61 MG/DL (ref 70–130)
GLUCOSE BLDC GLUCOMTR-MCNC: 90 MG/DL (ref 70–130)
GLUCOSE SERPL-MCNC: 135 MG/DL (ref 65–99)
GLUCOSE SERPL-MCNC: 232 MG/DL (ref 65–99)
GLUCOSE SERPL-MCNC: 88 MG/DL (ref 65–99)
HCO3 BLDV-SCNC: 20.9 MMOL/L (ref 22–28)
HGB BLDA-MCNC: 12.7 G/DL (ref 13.5–17.5)
INHALED O2 CONCENTRATION: 21 %
Lab: ABNORMAL
MAGNESIUM SERPL-MCNC: 1.9 MG/DL (ref 1.6–2.6)
MAGNESIUM SERPL-MCNC: 1.9 MG/DL (ref 1.6–2.6)
METHGB BLD QL: 0.4 % (ref 0–3)
MODALITY: ABNORMAL
OXYHGB MFR BLDV: 34.1 % (ref 45–75)
PCO2 BLDV: 43.5 MM HG (ref 41–51)
PH BLDV: 7.29 PH UNITS (ref 7.32–7.42)
PHOSPHATE SERPL-MCNC: 1.8 MG/DL (ref 2.5–4.5)
PHOSPHATE SERPL-MCNC: 2.3 MG/DL (ref 2.5–4.5)
PO2 BLDV: 19.6 MM HG (ref 27–53)
POTASSIUM SERPL-SCNC: 3.4 MMOL/L (ref 3.5–5.2)
POTASSIUM SERPL-SCNC: 3.7 MMOL/L (ref 3.5–5.2)
POTASSIUM SERPL-SCNC: 3.8 MMOL/L (ref 3.5–5.2)
QT INTERVAL: 360 MS
QT INTERVAL: 438 MS
QTC INTERVAL: 471 MS
QTC INTERVAL: 613 MS
SAO2 % BLDCOV: 34.5 % (ref 45–75)
SODIUM SERPL-SCNC: 133 MMOL/L (ref 136–145)
SODIUM SERPL-SCNC: 142 MMOL/L (ref 136–145)
SODIUM SERPL-SCNC: 143 MMOL/L (ref 136–145)
VENTILATOR MODE: ABNORMAL

## 2022-01-10 PROCEDURE — 96366 THER/PROPH/DIAG IV INF ADDON: CPT

## 2022-01-10 PROCEDURE — 84100 ASSAY OF PHOSPHORUS: CPT | Performed by: EMERGENCY MEDICINE

## 2022-01-10 PROCEDURE — 96376 TX/PRO/DX INJ SAME DRUG ADON: CPT

## 2022-01-10 PROCEDURE — 80048 BASIC METABOLIC PNL TOTAL CA: CPT | Performed by: EMERGENCY MEDICINE

## 2022-01-10 PROCEDURE — 82962 GLUCOSE BLOOD TEST: CPT

## 2022-01-10 PROCEDURE — 82805 BLOOD GASES W/O2 SATURATION: CPT

## 2022-01-10 PROCEDURE — 96372 THER/PROPH/DIAG INJ SC/IM: CPT

## 2022-01-10 PROCEDURE — 25010000002 ENOXAPARIN PER 10 MG: Performed by: STUDENT IN AN ORGANIZED HEALTH CARE EDUCATION/TRAINING PROGRAM

## 2022-01-10 PROCEDURE — G0378 HOSPITAL OBSERVATION PER HR: HCPCS

## 2022-01-10 PROCEDURE — 63710000001 INSULIN DETEMIR PER 5 UNITS: Performed by: EMERGENCY MEDICINE

## 2022-01-10 PROCEDURE — 0 MAGNESIUM SULFATE 4 GM/100ML SOLUTION: Performed by: STUDENT IN AN ORGANIZED HEALTH CARE EDUCATION/TRAINING PROGRAM

## 2022-01-10 PROCEDURE — 25010000002 CEFTRIAXONE PER 250 MG: Performed by: STUDENT IN AN ORGANIZED HEALTH CARE EDUCATION/TRAINING PROGRAM

## 2022-01-10 PROCEDURE — 82009 KETONE BODYS QUAL: CPT | Performed by: EMERGENCY MEDICINE

## 2022-01-10 PROCEDURE — 63710000001 INSULIN ASPART PER 5 UNITS: Performed by: EMERGENCY MEDICINE

## 2022-01-10 PROCEDURE — 80048 BASIC METABOLIC PNL TOTAL CA: CPT | Performed by: STUDENT IN AN ORGANIZED HEALTH CARE EDUCATION/TRAINING PROGRAM

## 2022-01-10 PROCEDURE — 94799 UNLISTED PULMONARY SVC/PX: CPT

## 2022-01-10 PROCEDURE — 82820 HEMOGLOBIN-OXYGEN AFFINITY: CPT

## 2022-01-10 PROCEDURE — 83735 ASSAY OF MAGNESIUM: CPT | Performed by: STUDENT IN AN ORGANIZED HEALTH CARE EDUCATION/TRAINING PROGRAM

## 2022-01-10 PROCEDURE — 84100 ASSAY OF PHOSPHORUS: CPT | Performed by: STUDENT IN AN ORGANIZED HEALTH CARE EDUCATION/TRAINING PROGRAM

## 2022-01-10 PROCEDURE — 83735 ASSAY OF MAGNESIUM: CPT | Performed by: EMERGENCY MEDICINE

## 2022-01-10 PROCEDURE — 99220 PR INITIAL OBSERVATION CARE/DAY 70 MINUTES: CPT | Performed by: PHYSICIAN ASSISTANT

## 2022-01-10 PROCEDURE — 81025 URINE PREGNANCY TEST: CPT | Performed by: EMERGENCY MEDICINE

## 2022-01-10 RX ORDER — ONDANSETRON 2 MG/ML
4 INJECTION INTRAMUSCULAR; INTRAVENOUS EVERY 6 HOURS PRN
Status: DISCONTINUED | OUTPATIENT
Start: 2022-01-10 | End: 2022-01-11 | Stop reason: HOSPADM

## 2022-01-10 RX ORDER — MAGNESIUM SULFATE HEPTAHYDRATE 40 MG/ML
4 INJECTION, SOLUTION INTRAVENOUS AS NEEDED
Status: DISCONTINUED | OUTPATIENT
Start: 2022-01-10 | End: 2022-01-11 | Stop reason: HOSPADM

## 2022-01-10 RX ORDER — BISACODYL 5 MG/1
5 TABLET, DELAYED RELEASE ORAL DAILY PRN
Status: DISCONTINUED | OUTPATIENT
Start: 2022-01-10 | End: 2022-01-11 | Stop reason: HOSPADM

## 2022-01-10 RX ORDER — SODIUM CHLORIDE 0.9 % (FLUSH) 0.9 %
10 SYRINGE (ML) INJECTION EVERY 12 HOURS SCHEDULED
Status: DISCONTINUED | OUTPATIENT
Start: 2022-01-10 | End: 2022-01-11 | Stop reason: HOSPADM

## 2022-01-10 RX ORDER — POLYETHYLENE GLYCOL 3350 17 G/17G
17 POWDER, FOR SOLUTION ORAL DAILY PRN
Status: DISCONTINUED | OUTPATIENT
Start: 2022-01-10 | End: 2022-01-11 | Stop reason: HOSPADM

## 2022-01-10 RX ORDER — MAGNESIUM SULFATE HEPTAHYDRATE 40 MG/ML
4 INJECTION, SOLUTION INTRAVENOUS ONCE
Status: COMPLETED | OUTPATIENT
Start: 2022-01-10 | End: 2022-01-10

## 2022-01-10 RX ORDER — POTASSIUM CHLORIDE 1.5 G/1.77G
40 POWDER, FOR SOLUTION ORAL AS NEEDED
Status: DISCONTINUED | OUTPATIENT
Start: 2022-01-10 | End: 2022-01-11

## 2022-01-10 RX ORDER — BISACODYL 10 MG
10 SUPPOSITORY, RECTAL RECTAL DAILY PRN
Status: DISCONTINUED | OUTPATIENT
Start: 2022-01-10 | End: 2022-01-11 | Stop reason: HOSPADM

## 2022-01-10 RX ORDER — NITROGLYCERIN 0.4 MG/1
0.4 TABLET SUBLINGUAL
Status: DISCONTINUED | OUTPATIENT
Start: 2022-01-10 | End: 2022-01-11 | Stop reason: HOSPADM

## 2022-01-10 RX ORDER — MAGNESIUM SULFATE HEPTAHYDRATE 40 MG/ML
2 INJECTION, SOLUTION INTRAVENOUS AS NEEDED
Status: DISCONTINUED | OUTPATIENT
Start: 2022-01-10 | End: 2022-01-11 | Stop reason: HOSPADM

## 2022-01-10 RX ORDER — DEXTROSE MONOHYDRATE 25 G/50ML
25 INJECTION, SOLUTION INTRAVENOUS ONCE
Status: DISCONTINUED | OUTPATIENT
Start: 2022-01-10 | End: 2022-01-10

## 2022-01-10 RX ORDER — AMOXICILLIN 250 MG
2 CAPSULE ORAL 2 TIMES DAILY
Status: DISCONTINUED | OUTPATIENT
Start: 2022-01-10 | End: 2022-01-11 | Stop reason: HOSPADM

## 2022-01-10 RX ORDER — POTASSIUM CHLORIDE 750 MG/1
40 TABLET, FILM COATED, EXTENDED RELEASE ORAL AS NEEDED
Status: DISCONTINUED | OUTPATIENT
Start: 2022-01-10 | End: 2022-01-11

## 2022-01-10 RX ORDER — SODIUM CHLORIDE 0.9 % (FLUSH) 0.9 %
10 SYRINGE (ML) INJECTION AS NEEDED
Status: DISCONTINUED | OUTPATIENT
Start: 2022-01-10 | End: 2022-01-11 | Stop reason: HOSPADM

## 2022-01-10 RX ADMIN — INSULIN ASPART 8 UNITS: 100 INJECTION, SOLUTION INTRAVENOUS; SUBCUTANEOUS at 17:00

## 2022-01-10 RX ADMIN — DOCUSATE SODIUM 50 MG AND SENNOSIDES 8.6 MG 2 TABLET: 8.6; 5 TABLET, FILM COATED ORAL at 20:47

## 2022-01-10 RX ADMIN — CEFTRIAXONE 1 G: 1 INJECTION, POWDER, FOR SOLUTION INTRAMUSCULAR; INTRAVENOUS at 17:00

## 2022-01-10 RX ADMIN — DEXTROSE MONOHYDRATE 25 G: 25 INJECTION, SOLUTION INTRAVENOUS at 05:40

## 2022-01-10 RX ADMIN — INSULIN DETEMIR 15 UNITS: 100 INJECTION, SOLUTION SUBCUTANEOUS at 22:37

## 2022-01-10 RX ADMIN — MAGNESIUM SULFATE HEPTAHYDRATE 4 G: 40 INJECTION, SOLUTION INTRAVENOUS at 17:00

## 2022-01-10 RX ADMIN — POTASSIUM & SODIUM PHOSPHATES POWDER PACK 280-160-250 MG 2 PACKET: 280-160-250 PACK at 17:00

## 2022-01-10 RX ADMIN — ENOXAPARIN SODIUM 40 MG: 40 INJECTION SUBCUTANEOUS at 16:59

## 2022-01-11 VITALS
HEIGHT: 66 IN | BODY MASS INDEX: 27.43 KG/M2 | TEMPERATURE: 97.9 F | HEART RATE: 67 BPM | WEIGHT: 170.7 LBS | DIASTOLIC BLOOD PRESSURE: 79 MMHG | SYSTOLIC BLOOD PRESSURE: 115 MMHG | RESPIRATION RATE: 13 BRPM | OXYGEN SATURATION: 98 %

## 2022-01-11 LAB
ANION GAP SERPL CALCULATED.3IONS-SCNC: 13.9 MMOL/L (ref 5–15)
BUN SERPL-MCNC: 12 MG/DL (ref 6–20)
BUN/CREAT SERPL: 15.2 (ref 7–25)
CALCIUM SPEC-SCNC: 8.1 MG/DL (ref 8.6–10.5)
CHLORIDE SERPL-SCNC: 104 MMOL/L (ref 98–107)
CO2 SERPL-SCNC: 19.1 MMOL/L (ref 22–29)
CREAT SERPL-MCNC: 0.79 MG/DL (ref 0.57–1)
GFR SERPL CREATININE-BSD FRML MDRD: 83 ML/MIN/1.73
GLUCOSE BLDC GLUCOMTR-MCNC: 183 MG/DL (ref 70–130)
GLUCOSE BLDC GLUCOMTR-MCNC: 52 MG/DL (ref 70–130)
GLUCOSE BLDC GLUCOMTR-MCNC: 81 MG/DL (ref 70–130)
GLUCOSE SERPL-MCNC: 325 MG/DL (ref 65–99)
MAGNESIUM SERPL-MCNC: 2.5 MG/DL (ref 1.6–2.6)
PHOSPHATE SERPL-MCNC: 2.5 MG/DL (ref 2.5–4.5)
POTASSIUM SERPL-SCNC: 3.9 MMOL/L (ref 3.5–5.2)
SODIUM SERPL-SCNC: 137 MMOL/L (ref 136–145)

## 2022-01-11 PROCEDURE — 99217 PR OBSERVATION CARE DISCHARGE MANAGEMENT: CPT | Performed by: PHYSICIAN ASSISTANT

## 2022-01-11 PROCEDURE — G0378 HOSPITAL OBSERVATION PER HR: HCPCS

## 2022-01-11 PROCEDURE — 82962 GLUCOSE BLOOD TEST: CPT

## 2022-01-11 PROCEDURE — 63710000001 INSULIN ASPART PER 5 UNITS: Performed by: EMERGENCY MEDICINE

## 2022-01-11 RX ORDER — NITROFURANTOIN 25; 75 MG/1; MG/1
100 CAPSULE ORAL 2 TIMES DAILY
Qty: 6 CAPSULE | Refills: 0 | Status: SHIPPED | OUTPATIENT
Start: 2022-01-11 | End: 2022-01-14

## 2022-01-11 RX ADMIN — SODIUM CHLORIDE, PRESERVATIVE FREE 10 ML: 5 INJECTION INTRAVENOUS at 08:07

## 2022-01-11 RX ADMIN — INSULIN ASPART 2 UNITS: 100 INJECTION, SOLUTION INTRAVENOUS; SUBCUTANEOUS at 10:53

## 2022-01-13 LAB
BACTERIA SPEC AEROBE CULT: NORMAL
BACTERIA SPEC AEROBE CULT: NORMAL

## 2022-01-17 ENCOUNTER — APPOINTMENT (OUTPATIENT)
Dept: GENERAL RADIOLOGY | Facility: HOSPITAL | Age: 37
End: 2022-01-17

## 2022-01-17 ENCOUNTER — HOSPITAL ENCOUNTER (INPATIENT)
Facility: HOSPITAL | Age: 37
LOS: 2 days | Discharge: HOME | End: 2022-01-20
Attending: EMERGENCY MEDICINE | Admitting: INTERNAL MEDICINE

## 2022-01-17 DIAGNOSIS — U07.1 COVID-19: ICD-10-CM

## 2022-01-17 DIAGNOSIS — E83.39 HYPOPHOSPHATEMIA: ICD-10-CM

## 2022-01-17 DIAGNOSIS — E10.10 DIABETIC KETOACIDOSIS WITHOUT COMA ASSOCIATED WITH TYPE 1 DIABETES MELLITUS: Primary | ICD-10-CM

## 2022-01-17 LAB
ACETONE BLD QL: ABNORMAL
ALBUMIN SERPL-MCNC: 4.67 G/DL (ref 3.5–5.2)
ALBUMIN/GLOB SERPL: 1.3 G/DL
ALP SERPL-CCNC: 186 U/L (ref 39–117)
ALT SERPL W P-5'-P-CCNC: 67 U/L (ref 1–33)
ANION GAP SERPL CALCULATED.3IONS-SCNC: 34.5 MMOL/L (ref 5–15)
AST SERPL-CCNC: 84 U/L (ref 1–32)
ATMOSPHERIC PRESS: 724 MMHG
BASE EXCESS BLDV CALC-SCNC: -21.8 MMOL/L (ref 0–2)
BASOPHILS # BLD AUTO: 0.03 10*3/MM3 (ref 0–0.2)
BASOPHILS NFR BLD AUTO: 0.3 % (ref 0–1.5)
BDY SITE: ABNORMAL
BILIRUB SERPL-MCNC: 0.3 MG/DL (ref 0–1.2)
BODY TEMPERATURE: 37 C
BUN SERPL-MCNC: 19 MG/DL (ref 6–20)
BUN/CREAT SERPL: 13.3 (ref 7–25)
CALCIUM SPEC-SCNC: 8.6 MG/DL (ref 8.6–10.5)
CHLORIDE SERPL-SCNC: 89 MMOL/L (ref 98–107)
CO2 BLDA-SCNC: 7.7 MMOL/L (ref 22–33)
CO2 SERPL-SCNC: 5.5 MMOL/L (ref 22–29)
COHGB MFR BLD: 0.8 % (ref 0–5)
CREAT SERPL-MCNC: 1.43 MG/DL (ref 0.57–1)
D-LACTATE SERPL-SCNC: 3.9 MMOL/L (ref 0.5–2)
D-LACTATE SERPL-SCNC: 4.5 MMOL/L (ref 0.5–2)
DEPRECATED RDW RBC AUTO: 53.9 FL (ref 37–54)
EOSINOPHIL # BLD AUTO: 0.01 10*3/MM3 (ref 0–0.4)
EOSINOPHIL NFR BLD AUTO: 0.1 % (ref 0.3–6.2)
ERYTHROCYTE [DISTWIDTH] IN BLOOD BY AUTOMATED COUNT: 14.3 % (ref 12.3–15.4)
ETHANOL BLD-MCNC: <10 MG/DL (ref 0–10)
ETHANOL UR QL: <0.01 %
FLUAV SUBTYP SPEC NAA+PROBE: NOT DETECTED
FLUBV RNA ISLT QL NAA+PROBE: NOT DETECTED
GFR SERPL CREATININE-BSD FRML MDRD: 42 ML/MIN/1.73
GLOBULIN UR ELPH-MCNC: 3.6 GM/DL
GLUCOSE BLDC GLUCOMTR-MCNC: 185 MG/DL (ref 70–130)
GLUCOSE BLDC GLUCOMTR-MCNC: 192 MG/DL (ref 70–130)
GLUCOSE BLDC GLUCOMTR-MCNC: 212 MG/DL (ref 70–130)
GLUCOSE BLDC GLUCOMTR-MCNC: 214 MG/DL (ref 70–130)
GLUCOSE BLDC GLUCOMTR-MCNC: 350 MG/DL (ref 70–130)
GLUCOSE BLDC GLUCOMTR-MCNC: 439 MG/DL (ref 70–130)
GLUCOSE BLDC GLUCOMTR-MCNC: 544 MG/DL (ref 70–130)
GLUCOSE BLDC GLUCOMTR-MCNC: 567 MG/DL (ref 70–130)
GLUCOSE SERPL-MCNC: 555 MG/DL (ref 65–99)
HBA1C MFR BLD: 9 % (ref 4.8–5.6)
HCG SERPL QL: NEGATIVE
HCO3 BLDV-SCNC: 6.9 MMOL/L (ref 22–28)
HCT VFR BLD AUTO: 49.5 % (ref 34–46.6)
HGB BLD-MCNC: 15.5 G/DL (ref 12–15.9)
HGB BLDA-MCNC: 15.4 G/DL (ref 13.5–17.5)
IMM GRANULOCYTES # BLD AUTO: 0.06 10*3/MM3 (ref 0–0.05)
IMM GRANULOCYTES NFR BLD AUTO: 0.5 % (ref 0–0.5)
INHALED O2 CONCENTRATION: 21 %
LYMPHOCYTES # BLD AUTO: 0.84 10*3/MM3 (ref 0.7–3.1)
LYMPHOCYTES NFR BLD AUTO: 7.4 % (ref 19.6–45.3)
Lab: ABNORMAL
Lab: ABNORMAL
MAGNESIUM SERPL-MCNC: 1.9 MG/DL (ref 1.6–2.6)
MCH RBC QN AUTO: 31.5 PG (ref 26.6–33)
MCHC RBC AUTO-ENTMCNC: 31.3 G/DL (ref 31.5–35.7)
MCV RBC AUTO: 100.6 FL (ref 79–97)
METHGB BLD QL: 0.7 % (ref 0–3)
MODALITY: ABNORMAL
MONOCYTES # BLD AUTO: 0.53 10*3/MM3 (ref 0.1–0.9)
MONOCYTES NFR BLD AUTO: 4.6 % (ref 5–12)
NEUTROPHILS NFR BLD AUTO: 87.1 % (ref 42.7–76)
NEUTROPHILS NFR BLD AUTO: 9.94 10*3/MM3 (ref 1.7–7)
NOTIFIED BY: ABNORMAL
NOTIFIED WHO: ABNORMAL
NRBC BLD AUTO-RTO: 0 /100 WBC (ref 0–0.2)
OXYHGB MFR BLDV: 54.4 % (ref 45–75)
PCO2 BLDV: 24.2 MM HG (ref 41–51)
PH BLDV: 7.07 PH UNITS (ref 7.32–7.42)
PHOSPHATE SERPL-MCNC: 3.8 MG/DL (ref 2.5–4.5)
PLATELET # BLD AUTO: 396 10*3/MM3 (ref 140–450)
PMV BLD AUTO: 9.8 FL (ref 6–12)
PO2 BLDV: 34.5 MM HG (ref 27–53)
POTASSIUM SERPL-SCNC: 5 MMOL/L (ref 3.5–5.2)
PROT SERPL-MCNC: 8.3 G/DL (ref 6–8.5)
RBC # BLD AUTO: 4.92 10*6/MM3 (ref 3.77–5.28)
SAO2 % BLDCOV: 55.2 % (ref 45–75)
SARS-COV-2 RNA PNL SPEC NAA+PROBE: DETECTED
SODIUM SERPL-SCNC: 129 MMOL/L (ref 136–145)
TROPONIN T SERPL-MCNC: <0.01 NG/ML (ref 0–0.03)
VENTILATOR MODE: ABNORMAL
WBC NRBC COR # BLD: 11.41 10*3/MM3 (ref 3.4–10.8)

## 2022-01-17 PROCEDURE — 83735 ASSAY OF MAGNESIUM: CPT | Performed by: PHYSICIAN ASSISTANT

## 2022-01-17 PROCEDURE — 84484 ASSAY OF TROPONIN QUANT: CPT | Performed by: PHYSICIAN ASSISTANT

## 2022-01-17 PROCEDURE — 85025 COMPLETE CBC W/AUTO DIFF WBC: CPT | Performed by: PHYSICIAN ASSISTANT

## 2022-01-17 PROCEDURE — 83930 ASSAY OF BLOOD OSMOLALITY: CPT | Performed by: PHYSICIAN ASSISTANT

## 2022-01-17 PROCEDURE — 25010000002 PROCHLORPERAZINE 10 MG/2ML SOLUTION: Performed by: PHYSICIAN ASSISTANT

## 2022-01-17 PROCEDURE — 93005 ELECTROCARDIOGRAM TRACING: CPT | Performed by: PHYSICIAN ASSISTANT

## 2022-01-17 PROCEDURE — 83036 HEMOGLOBIN GLYCOSYLATED A1C: CPT | Performed by: PHYSICIAN ASSISTANT

## 2022-01-17 PROCEDURE — 82009 KETONE BODYS QUAL: CPT | Performed by: PHYSICIAN ASSISTANT

## 2022-01-17 PROCEDURE — 82962 GLUCOSE BLOOD TEST: CPT

## 2022-01-17 PROCEDURE — 99285 EMERGENCY DEPT VISIT HI MDM: CPT

## 2022-01-17 PROCEDURE — 83605 ASSAY OF LACTIC ACID: CPT | Performed by: PHYSICIAN ASSISTANT

## 2022-01-17 PROCEDURE — 71045 X-RAY EXAM CHEST 1 VIEW: CPT | Performed by: RADIOLOGY

## 2022-01-17 PROCEDURE — 82820 HEMOGLOBIN-OXYGEN AFFINITY: CPT

## 2022-01-17 PROCEDURE — 87040 BLOOD CULTURE FOR BACTERIA: CPT | Performed by: PHYSICIAN ASSISTANT

## 2022-01-17 PROCEDURE — 84100 ASSAY OF PHOSPHORUS: CPT | Performed by: PHYSICIAN ASSISTANT

## 2022-01-17 PROCEDURE — 82077 ASSAY SPEC XCP UR&BREATH IA: CPT | Performed by: PHYSICIAN ASSISTANT

## 2022-01-17 PROCEDURE — 82805 BLOOD GASES W/O2 SATURATION: CPT

## 2022-01-17 PROCEDURE — 71045 X-RAY EXAM CHEST 1 VIEW: CPT

## 2022-01-17 PROCEDURE — 87150 DNA/RNA AMPLIFIED PROBE: CPT | Performed by: PHYSICIAN ASSISTANT

## 2022-01-17 PROCEDURE — 84703 CHORIONIC GONADOTROPIN ASSAY: CPT | Performed by: PHYSICIAN ASSISTANT

## 2022-01-17 PROCEDURE — 87147 CULTURE TYPE IMMUNOLOGIC: CPT | Performed by: PHYSICIAN ASSISTANT

## 2022-01-17 PROCEDURE — 80053 COMPREHEN METABOLIC PANEL: CPT | Performed by: PHYSICIAN ASSISTANT

## 2022-01-17 PROCEDURE — 87636 SARSCOV2 & INF A&B AMP PRB: CPT | Performed by: PHYSICIAN ASSISTANT

## 2022-01-17 RX ORDER — DEXTROSE AND SODIUM CHLORIDE 5; .9 G/100ML; G/100ML
150 INJECTION, SOLUTION INTRAVENOUS CONTINUOUS PRN
Status: DISCONTINUED | OUTPATIENT
Start: 2022-01-17 | End: 2022-01-19

## 2022-01-17 RX ORDER — SODIUM CHLORIDE 0.9 % (FLUSH) 0.9 %
10 SYRINGE (ML) INJECTION AS NEEDED
Status: DISCONTINUED | OUTPATIENT
Start: 2022-01-17 | End: 2022-01-20 | Stop reason: HOSPADM

## 2022-01-17 RX ORDER — SODIUM CHLORIDE 9 MG/ML
250 INJECTION, SOLUTION INTRAVENOUS CONTINUOUS PRN
Status: DISCONTINUED | OUTPATIENT
Start: 2022-01-17 | End: 2022-01-19

## 2022-01-17 RX ORDER — PROCHLORPERAZINE EDISYLATE 5 MG/ML
5 INJECTION INTRAMUSCULAR; INTRAVENOUS ONCE
Status: COMPLETED | OUTPATIENT
Start: 2022-01-17 | End: 2022-01-17

## 2022-01-17 RX ORDER — SODIUM CHLORIDE 0.9 % (FLUSH) 0.9 %
3 SYRINGE (ML) INJECTION EVERY 12 HOURS SCHEDULED
Status: DISCONTINUED | OUTPATIENT
Start: 2022-01-17 | End: 2022-01-20 | Stop reason: HOSPADM

## 2022-01-17 RX ORDER — DEXTROSE MONOHYDRATE 25 G/50ML
12.5 INJECTION, SOLUTION INTRAVENOUS AS NEEDED
Status: DISCONTINUED | OUTPATIENT
Start: 2022-01-17 | End: 2022-01-20 | Stop reason: HOSPADM

## 2022-01-17 RX ORDER — DEXTROSE AND SODIUM CHLORIDE 5; .45 G/100ML; G/100ML
150 INJECTION, SOLUTION INTRAVENOUS CONTINUOUS PRN
Status: DISCONTINUED | OUTPATIENT
Start: 2022-01-17 | End: 2022-01-19

## 2022-01-17 RX ORDER — SODIUM CHLORIDE AND POTASSIUM CHLORIDE 150; 900 MG/100ML; MG/100ML
250 INJECTION, SOLUTION INTRAVENOUS CONTINUOUS PRN
Status: DISCONTINUED | OUTPATIENT
Start: 2022-01-17 | End: 2022-01-19

## 2022-01-17 RX ORDER — SODIUM CHLORIDE 450 MG/100ML
250 INJECTION, SOLUTION INTRAVENOUS CONTINUOUS PRN
Status: DISCONTINUED | OUTPATIENT
Start: 2022-01-17 | End: 2022-01-19

## 2022-01-17 RX ORDER — SODIUM CHLORIDE AND POTASSIUM CHLORIDE 150; 450 MG/100ML; MG/100ML
250 INJECTION, SOLUTION INTRAVENOUS CONTINUOUS PRN
Status: DISCONTINUED | OUTPATIENT
Start: 2022-01-17 | End: 2022-01-19

## 2022-01-17 RX ORDER — SODIUM CHLORIDE 9 MG/ML
10 INJECTION, SOLUTION INTRAVENOUS CONTINUOUS PRN
Status: DISCONTINUED | OUTPATIENT
Start: 2022-01-17 | End: 2022-01-19

## 2022-01-17 RX ORDER — SODIUM CHLORIDE AND POTASSIUM CHLORIDE 300; 900 MG/100ML; MG/100ML
250 INJECTION, SOLUTION INTRAVENOUS CONTINUOUS PRN
Status: DISCONTINUED | OUTPATIENT
Start: 2022-01-17 | End: 2022-01-19

## 2022-01-17 RX ORDER — DEXTROSE, SODIUM CHLORIDE, AND POTASSIUM CHLORIDE 5; .45; .15 G/100ML; G/100ML; G/100ML
150 INJECTION INTRAVENOUS CONTINUOUS PRN
Status: DISCONTINUED | OUTPATIENT
Start: 2022-01-17 | End: 2022-01-19

## 2022-01-17 RX ORDER — POTASSIUM CHLORIDE, DEXTROSE MONOHYDRATE AND SODIUM CHLORIDE 300; 5; 900 MG/100ML; G/100ML; MG/100ML
150 INJECTION, SOLUTION INTRAVENOUS CONTINUOUS PRN
Status: DISCONTINUED | OUTPATIENT
Start: 2022-01-17 | End: 2022-01-19

## 2022-01-17 RX ORDER — SODIUM CHLORIDE 0.9 % (FLUSH) 0.9 %
10 SYRINGE (ML) INJECTION ONCE AS NEEDED
Status: DISCONTINUED | OUTPATIENT
Start: 2022-01-17 | End: 2022-01-20 | Stop reason: HOSPADM

## 2022-01-17 RX ORDER — DEXTROSE, SODIUM CHLORIDE, AND POTASSIUM CHLORIDE 5; .45; .15 G/100ML; G/100ML; G/100ML
150 INJECTION INTRAVENOUS CONTINUOUS
Status: DISCONTINUED | OUTPATIENT
Start: 2022-01-17 | End: 2022-01-17

## 2022-01-17 RX ORDER — DEXTROSE, SODIUM CHLORIDE, AND POTASSIUM CHLORIDE 5; .9; .15 G/100ML; G/100ML; G/100ML
150 INJECTION INTRAVENOUS CONTINUOUS PRN
Status: DISCONTINUED | OUTPATIENT
Start: 2022-01-17 | End: 2022-01-19

## 2022-01-17 RX ORDER — DEXTROSE, SODIUM CHLORIDE, AND POTASSIUM CHLORIDE 5; .45; .3 G/100ML; G/100ML; G/100ML
150 INJECTION INTRAVENOUS CONTINUOUS PRN
Status: DISCONTINUED | OUTPATIENT
Start: 2022-01-17 | End: 2022-01-19

## 2022-01-17 RX ADMIN — PROCHLORPERAZINE EDISYLATE 5 MG: 5 INJECTION INTRAMUSCULAR; INTRAVENOUS at 18:40

## 2022-01-17 RX ADMIN — PROCHLORPERAZINE EDISYLATE 5 MG: 5 INJECTION INTRAMUSCULAR; INTRAVENOUS at 16:27

## 2022-01-17 RX ADMIN — POTASSIUM CHLORIDE, DEXTROSE MONOHYDRATE AND SODIUM CHLORIDE 150 ML/HR: 300; 5; 450 INJECTION, SOLUTION INTRAVENOUS at 22:21

## 2022-01-17 RX ADMIN — SODIUM CHLORIDE 2000 ML: 9 INJECTION, SOLUTION INTRAVENOUS at 16:11

## 2022-01-17 RX ADMIN — INSULIN HUMAN 7 UNITS/HR: 1 INJECTION, SOLUTION INTRAVENOUS at 16:52

## 2022-01-17 NOTE — ED PROVIDER NOTES
Subjective   36-year-old white female presents secondary to weakness along with nausea vomiting and elevated glucose.  Patient recently had DKA.  She got out of the hospital on Tuesday.  She states she started having nausea vomiting and weakness on Saturday.  This is progressively worsened.  She denies any chest pain pressure tightness or squeezing.  She states she feels short of breath.  She denies any exposure to COVID or COVID symptoms otherwise.  Her last menstrual cycle was last Saturday.  She voices no other complaints this time.          Review of Systems   Constitutional: Negative.  Negative for fever.   HENT: Negative.    Respiratory: Negative.    Cardiovascular: Negative.  Negative for chest pain.   Gastrointestinal: Negative.  Negative for abdominal pain.   Endocrine: Negative.    Genitourinary: Negative.  Negative for dysuria.   Skin: Negative.    Neurological: Negative.    Psychiatric/Behavioral: Negative.    All other systems reviewed and are negative.      Past Medical History:   Diagnosis Date   • Diabetes (HCC)        No Known Allergies    Past Surgical History:   Procedure Laterality Date   • TONSILLECTOMY         Family History   Problem Relation Age of Onset   • Diabetes Mother    • Heart disease Paternal Grandmother        Social History     Socioeconomic History   • Marital status: Single   Tobacco Use   • Smoking status: Never Smoker   • Smokeless tobacco: Never Used   Substance and Sexual Activity   • Alcohol use: Not Currently           Objective   Physical Exam  Vitals and nursing note reviewed.   Constitutional:       General: She is not in acute distress.     Appearance: She is well-developed. She is not diaphoretic.      Comments: Kussmaul breathing, pale.  Appears ill.   HENT:      Head: Normocephalic and atraumatic.      Right Ear: External ear normal.      Left Ear: External ear normal.      Nose: Nose normal.   Eyes:      Conjunctiva/sclera: Conjunctivae normal.      Pupils: Pupils  are equal, round, and reactive to light.   Neck:      Vascular: No JVD.      Trachea: No tracheal deviation.   Cardiovascular:      Rate and Rhythm: Normal rate and regular rhythm.      Heart sounds: Normal heart sounds. No murmur heard.      Pulmonary:      Effort: Pulmonary effort is normal. No respiratory distress.      Breath sounds: Normal breath sounds. No wheezing.   Abdominal:      General: Bowel sounds are normal.      Palpations: Abdomen is soft.      Tenderness: There is no abdominal tenderness.   Musculoskeletal:         General: No deformity. Normal range of motion.      Cervical back: Normal range of motion and neck supple.   Skin:     General: Skin is warm and dry.      Coloration: Skin is not pale.      Findings: No erythema or rash.   Neurological:      Mental Status: She is alert and oriented to person, place, and time.      Cranial Nerves: No cranial nerve deficit.   Psychiatric:         Behavior: Behavior normal.         Thought Content: Thought content normal.         Procedures           ED Course  ED Course as of 01/18/22 1633   Mon Jan 17, 2022   1654 Unfortunately due to winter weather along with pandemic there are no options of transfer.  There are also no beds in this facility.  We will treat patient here in the ER until a bed becomes available. [JI]   1859 ECG 12 Lead  Sinus tachycardia.  Rate 134.  Normal axis.  Normal QT interval.  Right atrial enlargement.  Baseline artifact with no definite acute ischemic changes.  Abnormal EKG.  Interpreted by me. [BC]   2000 Signed out to Dr Brian [JI]   Tue Jan 18, 2022   0607 XR Chest 1 View  IMPRESSION:    Unremarkable exam. No acute cardiopulmonary findings identified. [ES]   0848 Resumed care at shift change.  Patient resting comfortably.  Labs are improving.  Disc obtained patient's repeat BMP and her gap is closed.  We will stop her insulin drip. [JI]   1991 Patient was accepted in [place].  To Dr. Woody [JI]   6303 Called a preliminary  blood culture, patient has staph not aureus.  We will continue to follow. [JI]   8191 Patient was accepted by Dr. Jones [JI]      ED Course User Index  [BC] North Hannah MD  [ES] Jaya Brian MD  [JI] Charles Everett PA                                                 MDM  Number of Diagnoses or Management Options  COVID-19: new and requires workup  Diabetic ketoacidosis without coma associated with type 1 diabetes mellitus (HCC): new and requires workup  Hypophosphatemia: new and requires workup     Amount and/or Complexity of Data Reviewed  Clinical lab tests: reviewed and ordered  Tests in the radiology section of CPT®: reviewed and ordered  Tests in the medicine section of CPT®: reviewed and ordered  Independent visualization of images, tracings, or specimens: yes        Final diagnoses:   Diabetic ketoacidosis without coma associated with type 1 diabetes mellitus (HCC)   Hypophosphatemia   COVID-19       ED Disposition  ED Disposition     ED Disposition Condition Comment    Decision to Admit  Level of Care: Progressive Care [20]   Diagnosis: DKA (diabetic ketoacidosis) (HCC) [308939]   Admitting Physician: RIGO JONES [[id number]]   Attending Physician: RIGO JONES [[id number]]   Certification: I Certify That Inpatient Hospital Services Are Medically Necessary For Greater Than 2 Midnights            No follow-up provider specified.       Medication List      No changes were made to your prescriptions during this visit.          Charles Everett PA  01/18/22 3534

## 2022-01-18 ENCOUNTER — APPOINTMENT (OUTPATIENT)
Dept: CT IMAGING | Facility: HOSPITAL | Age: 37
End: 2022-01-18

## 2022-01-18 PROBLEM — E11.10 DKA (DIABETIC KETOACIDOSIS): Status: ACTIVE | Noted: 2022-01-18

## 2022-01-18 LAB
ACETONE BLD QL: ABNORMAL
ACETONE BLD QL: NEGATIVE
ANION GAP SERPL CALCULATED.3IONS-SCNC: 13.4 MMOL/L (ref 5–15)
ANION GAP SERPL CALCULATED.3IONS-SCNC: 15.5 MMOL/L (ref 5–15)
ANION GAP SERPL CALCULATED.3IONS-SCNC: 16.2 MMOL/L (ref 5–15)
ANION GAP SERPL CALCULATED.3IONS-SCNC: 16.4 MMOL/L (ref 5–15)
ANION GAP SERPL CALCULATED.3IONS-SCNC: 17 MMOL/L (ref 5–15)
ANION GAP SERPL CALCULATED.3IONS-SCNC: 18.2 MMOL/L (ref 5–15)
ATMOSPHERIC PRESS: 730 MMHG
BACTERIA BLD CULT: ABNORMAL
BASE EXCESS BLDV CALC-SCNC: -5 MMOL/L (ref 0–2)
BASOPHILS # BLD AUTO: 0.01 10*3/MM3 (ref 0–0.2)
BASOPHILS NFR BLD AUTO: 0.1 % (ref 0–1.5)
BDY SITE: ABNORMAL
BODY TEMPERATURE: 37 C
BOTTLE TYPE: ABNORMAL
BUN SERPL-MCNC: 10 MG/DL (ref 6–20)
BUN SERPL-MCNC: 11 MG/DL (ref 6–20)
BUN SERPL-MCNC: 11 MG/DL (ref 6–20)
BUN/CREAT SERPL: 11 (ref 7–25)
BUN/CREAT SERPL: 11.8 (ref 7–25)
BUN/CREAT SERPL: 12.3 (ref 7–25)
BUN/CREAT SERPL: 12.3 (ref 7–25)
BUN/CREAT SERPL: 12.4 (ref 7–25)
BUN/CREAT SERPL: 13.3 (ref 7–25)
CALCIUM SPEC-SCNC: 8.2 MG/DL (ref 8.6–10.5)
CALCIUM SPEC-SCNC: 8.2 MG/DL (ref 8.6–10.5)
CALCIUM SPEC-SCNC: 8.3 MG/DL (ref 8.6–10.5)
CALCIUM SPEC-SCNC: 8.4 MG/DL (ref 8.6–10.5)
CALCIUM SPEC-SCNC: 8.4 MG/DL (ref 8.6–10.5)
CALCIUM SPEC-SCNC: 8.8 MG/DL (ref 8.6–10.5)
CHLORIDE SERPL-SCNC: 101 MMOL/L (ref 98–107)
CHLORIDE SERPL-SCNC: 102 MMOL/L (ref 98–107)
CHLORIDE SERPL-SCNC: 103 MMOL/L (ref 98–107)
CO2 BLDA-SCNC: 21.1 MMOL/L (ref 22–33)
CO2 SERPL-SCNC: 13.8 MMOL/L (ref 22–29)
CO2 SERPL-SCNC: 14 MMOL/L (ref 22–29)
CO2 SERPL-SCNC: 15.5 MMOL/L (ref 22–29)
CO2 SERPL-SCNC: 15.6 MMOL/L (ref 22–29)
CO2 SERPL-SCNC: 15.6 MMOL/L (ref 22–29)
CO2 SERPL-SCNC: 16.8 MMOL/L (ref 22–29)
COHGB MFR BLD: 0.4 % (ref 0–5)
CREAT SERPL-MCNC: 0.81 MG/DL (ref 0.57–1)
CREAT SERPL-MCNC: 0.81 MG/DL (ref 0.57–1)
CREAT SERPL-MCNC: 0.83 MG/DL (ref 0.57–1)
CREAT SERPL-MCNC: 0.85 MG/DL (ref 0.57–1)
CREAT SERPL-MCNC: 0.89 MG/DL (ref 0.57–1)
CREAT SERPL-MCNC: 0.91 MG/DL (ref 0.57–1)
D DIMER PPP FEU-MCNC: 1.98 MCGFEU/ML (ref 0–0.5)
D-LACTATE SERPL-SCNC: 1.1 MMOL/L (ref 0.5–2)
DEPRECATED RDW RBC AUTO: 48.8 FL (ref 37–54)
EOSINOPHIL # BLD AUTO: 0 10*3/MM3 (ref 0–0.4)
EOSINOPHIL NFR BLD AUTO: 0 % (ref 0.3–6.2)
ERYTHROCYTE [DISTWIDTH] IN BLOOD BY AUTOMATED COUNT: 14 % (ref 12.3–15.4)
GFR SERPL CREATININE-BSD FRML MDRD: 70 ML/MIN/1.73
GFR SERPL CREATININE-BSD FRML MDRD: 72 ML/MIN/1.73
GFR SERPL CREATININE-BSD FRML MDRD: 76 ML/MIN/1.73
GFR SERPL CREATININE-BSD FRML MDRD: 78 ML/MIN/1.73
GFR SERPL CREATININE-BSD FRML MDRD: 80 ML/MIN/1.73
GFR SERPL CREATININE-BSD FRML MDRD: 80 ML/MIN/1.73
GLUCOSE BLDC GLUCOMTR-MCNC: 114 MG/DL (ref 70–130)
GLUCOSE BLDC GLUCOMTR-MCNC: 116 MG/DL (ref 70–130)
GLUCOSE BLDC GLUCOMTR-MCNC: 122 MG/DL (ref 70–130)
GLUCOSE BLDC GLUCOMTR-MCNC: 130 MG/DL (ref 70–130)
GLUCOSE BLDC GLUCOMTR-MCNC: 133 MG/DL (ref 70–130)
GLUCOSE BLDC GLUCOMTR-MCNC: 136 MG/DL (ref 70–130)
GLUCOSE BLDC GLUCOMTR-MCNC: 141 MG/DL (ref 70–130)
GLUCOSE BLDC GLUCOMTR-MCNC: 159 MG/DL (ref 70–130)
GLUCOSE BLDC GLUCOMTR-MCNC: 167 MG/DL (ref 70–130)
GLUCOSE BLDC GLUCOMTR-MCNC: 171 MG/DL (ref 70–130)
GLUCOSE BLDC GLUCOMTR-MCNC: 188 MG/DL (ref 70–130)
GLUCOSE BLDC GLUCOMTR-MCNC: 201 MG/DL (ref 70–130)
GLUCOSE BLDC GLUCOMTR-MCNC: 209 MG/DL (ref 70–130)
GLUCOSE BLDC GLUCOMTR-MCNC: 216 MG/DL (ref 70–130)
GLUCOSE BLDC GLUCOMTR-MCNC: 244 MG/DL (ref 70–130)
GLUCOSE BLDC GLUCOMTR-MCNC: 247 MG/DL (ref 70–130)
GLUCOSE BLDC GLUCOMTR-MCNC: 252 MG/DL (ref 70–130)
GLUCOSE BLDC GLUCOMTR-MCNC: 264 MG/DL (ref 70–130)
GLUCOSE BLDC GLUCOMTR-MCNC: 57 MG/DL (ref 70–130)
GLUCOSE BLDC GLUCOMTR-MCNC: 74 MG/DL (ref 70–130)
GLUCOSE BLDC GLUCOMTR-MCNC: 77 MG/DL (ref 70–130)
GLUCOSE BLDC GLUCOMTR-MCNC: 89 MG/DL (ref 70–130)
GLUCOSE SERPL-MCNC: 115 MG/DL (ref 65–99)
GLUCOSE SERPL-MCNC: 158 MG/DL (ref 65–99)
GLUCOSE SERPL-MCNC: 169 MG/DL (ref 65–99)
GLUCOSE SERPL-MCNC: 229 MG/DL (ref 65–99)
GLUCOSE SERPL-MCNC: 277 MG/DL (ref 65–99)
GLUCOSE SERPL-MCNC: 73 MG/DL (ref 65–99)
HCO3 BLDV-SCNC: 20 MMOL/L (ref 22–28)
HCT VFR BLD AUTO: 40.2 % (ref 34–46.6)
HGB BLD-MCNC: 13.6 G/DL (ref 12–15.9)
HGB BLDA-MCNC: 13.9 G/DL (ref 13.5–17.5)
IMM GRANULOCYTES # BLD AUTO: 0.02 10*3/MM3 (ref 0–0.05)
IMM GRANULOCYTES NFR BLD AUTO: 0.3 % (ref 0–0.5)
INHALED O2 CONCENTRATION: 21 %
LYMPHOCYTES # BLD AUTO: 1.44 10*3/MM3 (ref 0.7–3.1)
LYMPHOCYTES NFR BLD AUTO: 19.8 % (ref 19.6–45.3)
Lab: ABNORMAL
MAGNESIUM SERPL-MCNC: 1.6 MG/DL (ref 1.6–2.6)
MAGNESIUM SERPL-MCNC: 1.7 MG/DL (ref 1.6–2.6)
MAGNESIUM SERPL-MCNC: 1.7 MG/DL (ref 1.6–2.6)
MAGNESIUM SERPL-MCNC: 1.8 MG/DL (ref 1.6–2.6)
MAGNESIUM SERPL-MCNC: 1.8 MG/DL (ref 1.6–2.6)
MCH RBC QN AUTO: 31.9 PG (ref 26.6–33)
MCHC RBC AUTO-ENTMCNC: 33.8 G/DL (ref 31.5–35.7)
MCV RBC AUTO: 94.1 FL (ref 79–97)
METHGB BLD QL: 0.3 % (ref 0–3)
MODALITY: ABNORMAL
MONOCYTES # BLD AUTO: 1.3 10*3/MM3 (ref 0.1–0.9)
MONOCYTES NFR BLD AUTO: 17.9 % (ref 5–12)
NEUTROPHILS NFR BLD AUTO: 4.49 10*3/MM3 (ref 1.7–7)
NEUTROPHILS NFR BLD AUTO: 61.9 % (ref 42.7–76)
NRBC BLD AUTO-RTO: 0 /100 WBC (ref 0–0.2)
OSMOLALITY SERPL: 329 MOSM/KG (ref 275–300)
OXYHGB MFR BLDV: 72.3 % (ref 45–75)
PCO2 BLDV: 36.2 MM HG (ref 41–51)
PH BLDV: 7.35 PH UNITS (ref 7.32–7.42)
PHOSPHATE SERPL-MCNC: 1.1 MG/DL (ref 2.5–4.5)
PHOSPHATE SERPL-MCNC: 1.2 MG/DL (ref 2.5–4.5)
PHOSPHATE SERPL-MCNC: 1.8 MG/DL (ref 2.5–4.5)
PHOSPHATE SERPL-MCNC: 1.8 MG/DL (ref 2.5–4.5)
PHOSPHATE SERPL-MCNC: 2.3 MG/DL (ref 2.5–4.5)
PLATELET # BLD AUTO: 306 10*3/MM3 (ref 140–450)
PMV BLD AUTO: 8.9 FL (ref 6–12)
PO2 BLDV: 35.4 MM HG (ref 27–53)
POTASSIUM SERPL-SCNC: 3.1 MMOL/L (ref 3.5–5.2)
POTASSIUM SERPL-SCNC: 3.7 MMOL/L (ref 3.5–5.2)
POTASSIUM SERPL-SCNC: 3.7 MMOL/L (ref 3.5–5.2)
POTASSIUM SERPL-SCNC: 3.9 MMOL/L (ref 3.5–5.2)
POTASSIUM SERPL-SCNC: 3.9 MMOL/L (ref 3.5–5.2)
POTASSIUM SERPL-SCNC: 4.1 MMOL/L (ref 3.5–5.2)
RBC # BLD AUTO: 4.27 10*6/MM3 (ref 3.77–5.28)
SAO2 % BLDCOV: 72.8 % (ref 45–75)
SODIUM SERPL-SCNC: 132 MMOL/L (ref 136–145)
SODIUM SERPL-SCNC: 133 MMOL/L (ref 136–145)
SODIUM SERPL-SCNC: 136 MMOL/L (ref 136–145)
VENTILATOR MODE: ABNORMAL
WBC NRBC COR # BLD: 7.26 10*3/MM3 (ref 3.4–10.8)

## 2022-01-18 PROCEDURE — 80048 BASIC METABOLIC PNL TOTAL CA: CPT | Performed by: EMERGENCY MEDICINE

## 2022-01-18 PROCEDURE — 84100 ASSAY OF PHOSPHORUS: CPT | Performed by: PHYSICIAN ASSISTANT

## 2022-01-18 PROCEDURE — 63710000001 INSULIN ASPART PER 5 UNITS: Performed by: PHYSICIAN ASSISTANT

## 2022-01-18 PROCEDURE — 63710000001 INSULIN DETEMIR PER 5 UNITS: Performed by: PHYSICIAN ASSISTANT

## 2022-01-18 PROCEDURE — 80048 BASIC METABOLIC PNL TOTAL CA: CPT | Performed by: PHYSICIAN ASSISTANT

## 2022-01-18 PROCEDURE — 84100 ASSAY OF PHOSPHORUS: CPT | Performed by: INTERNAL MEDICINE

## 2022-01-18 PROCEDURE — 82962 GLUCOSE BLOOD TEST: CPT

## 2022-01-18 PROCEDURE — 83735 ASSAY OF MAGNESIUM: CPT | Performed by: INTERNAL MEDICINE

## 2022-01-18 PROCEDURE — 25010000002 ONDANSETRON PER 1 MG: Performed by: PHYSICIAN ASSISTANT

## 2022-01-18 PROCEDURE — 36415 COLL VENOUS BLD VENIPUNCTURE: CPT

## 2022-01-18 PROCEDURE — 82009 KETONE BODYS QUAL: CPT | Performed by: PHYSICIAN ASSISTANT

## 2022-01-18 PROCEDURE — 83735 ASSAY OF MAGNESIUM: CPT | Performed by: PHYSICIAN ASSISTANT

## 2022-01-18 PROCEDURE — 85379 FIBRIN DEGRADATION QUANT: CPT | Performed by: INTERNAL MEDICINE

## 2022-01-18 PROCEDURE — 82805 BLOOD GASES W/O2 SATURATION: CPT

## 2022-01-18 PROCEDURE — 71275 CT ANGIOGRAPHY CHEST: CPT

## 2022-01-18 PROCEDURE — 0 IOPAMIDOL PER 1 ML: Performed by: INTERNAL MEDICINE

## 2022-01-18 PROCEDURE — 82820 HEMOGLOBIN-OXYGEN AFFINITY: CPT

## 2022-01-18 PROCEDURE — 80048 BASIC METABOLIC PNL TOTAL CA: CPT | Performed by: INTERNAL MEDICINE

## 2022-01-18 PROCEDURE — 99223 1ST HOSP IP/OBS HIGH 75: CPT | Performed by: INTERNAL MEDICINE

## 2022-01-18 PROCEDURE — 85025 COMPLETE CBC W/AUTO DIFF WBC: CPT | Performed by: EMERGENCY MEDICINE

## 2022-01-18 PROCEDURE — 83605 ASSAY OF LACTIC ACID: CPT | Performed by: EMERGENCY MEDICINE

## 2022-01-18 RX ORDER — DEXTROSE MONOHYDRATE 25 G/50ML
25 INJECTION, SOLUTION INTRAVENOUS ONCE
Status: COMPLETED | OUTPATIENT
Start: 2022-01-18 | End: 2022-01-18

## 2022-01-18 RX ORDER — SODIUM CHLORIDE 0.9 % (FLUSH) 0.9 %
10 SYRINGE (ML) INJECTION EVERY 12 HOURS SCHEDULED
Status: DISCONTINUED | OUTPATIENT
Start: 2022-01-18 | End: 2022-01-20 | Stop reason: HOSPADM

## 2022-01-18 RX ORDER — ATORVASTATIN CALCIUM 10 MG/1
10 TABLET, FILM COATED ORAL DAILY
Status: DISCONTINUED | OUTPATIENT
Start: 2022-01-18 | End: 2022-01-20 | Stop reason: HOSPADM

## 2022-01-18 RX ORDER — NICOTINE POLACRILEX 4 MG
15 LOZENGE BUCCAL
Status: DISCONTINUED | OUTPATIENT
Start: 2022-01-18 | End: 2022-01-20 | Stop reason: HOSPADM

## 2022-01-18 RX ORDER — DEXTROSE MONOHYDRATE 25 G/50ML
25 INJECTION, SOLUTION INTRAVENOUS
Status: DISCONTINUED | OUTPATIENT
Start: 2022-01-18 | End: 2022-01-20 | Stop reason: HOSPADM

## 2022-01-18 RX ORDER — ONDANSETRON 2 MG/ML
4 INJECTION INTRAMUSCULAR; INTRAVENOUS ONCE
Status: COMPLETED | OUTPATIENT
Start: 2022-01-18 | End: 2022-01-18

## 2022-01-18 RX ORDER — SODIUM CHLORIDE 0.9 % (FLUSH) 0.9 %
10 SYRINGE (ML) INJECTION AS NEEDED
Status: DISCONTINUED | OUTPATIENT
Start: 2022-01-18 | End: 2022-01-20 | Stop reason: HOSPADM

## 2022-01-18 RX ADMIN — DEXTROSE MONOHYDRATE 12.5 G: 500 INJECTION PARENTERAL at 08:23

## 2022-01-18 RX ADMIN — INSULIN ASPART 6 UNITS: 100 INJECTION, SOLUTION INTRAVENOUS; SUBCUTANEOUS at 17:26

## 2022-01-18 RX ADMIN — POTASSIUM CHLORIDE, DEXTROSE MONOHYDRATE AND SODIUM CHLORIDE 150 ML/HR: 300; 5; 450 INJECTION, SOLUTION INTRAVENOUS at 15:12

## 2022-01-18 RX ADMIN — DEXTROSE MONOHYDRATE 25 G: 500 INJECTION PARENTERAL at 03:24

## 2022-01-18 RX ADMIN — INSULIN DETEMIR 10 UNITS: 100 INJECTION, SOLUTION SUBCUTANEOUS at 15:07

## 2022-01-18 RX ADMIN — ONDANSETRON 4 MG: 2 INJECTION INTRAMUSCULAR; INTRAVENOUS at 11:02

## 2022-01-18 RX ADMIN — DEXTROSE MONOHYDRATE 25 G: 500 INJECTION PARENTERAL at 07:11

## 2022-01-18 RX ADMIN — DEXTROSE AND SODIUM CHLORIDE 150 ML/HR: 5; 450 INJECTION, SOLUTION INTRAVENOUS at 16:05

## 2022-01-18 RX ADMIN — IOPAMIDOL 85 ML: 755 INJECTION, SOLUTION INTRAVENOUS at 22:08

## 2022-01-18 RX ADMIN — INSULIN ASPART 4 UNITS: 100 INJECTION, SOLUTION INTRAVENOUS; SUBCUTANEOUS at 11:55

## 2022-01-18 RX ADMIN — SODIUM PHOSPHATE, MONOBASIC, MONOHYDRATE AND SODIUM PHOSPHATE, DIBASIC, ANHYDROUS 45 MMOL: 276; 142 INJECTION, SOLUTION INTRAVENOUS at 09:30

## 2022-01-18 NOTE — ED NOTES
Called to check on status of pt bed.  They had a pt escalate in their er and they no longer have an immediate bed for pt.      Nataliya Quan  01/18/22 0160

## 2022-01-18 NOTE — ED NOTES
Pt. Glucose 167 PAR[address]ISE Navas notified     Michael Cadet, PCT  01/18/22 0903       Michael Cadet, PCT  01/18/22 0956

## 2022-01-18 NOTE — H&P
[facility] HOSPITALIST HISTORY AND PHYSICAL    Patient Identification:  Name:  Marta Hahn  Age:  36 y.o.  Sex:  female  :  1985  MRN:  [id number]   Admit Date: 2022   Visit Number:  [id number]  Room number:  116/16  Primary Care Physician:  Paul Nava MD     Subjective     Chief complaint:    Chief Complaint   Patient presents with   • Blood Sugar Problem     History of presenting illness:   Ms Hahn is 36F PMH IDDM Type I, recently admitted this facility 1/10-11 for Sepsis, UTI, DKA, was discharged home the following day after clinical and lab improvement, sent home on Macrobid for UTI w/ plans to f/u w/ her endocrinologist outpatient.  Patient was discharged on 25U Glargine and 15-20U TID before meals.  Today patient presents w/ nausea and vomiting, found to be again in DKA but also this time testing positive for Covid 19.  Patient denies any fevers or chills, denies any respiratory symptoms or dyspnea, is on room air, essentially asymptomatic from Covid 19 standpoint.  She denies any dysuria, urine cultures were not obtained prior admission.  On arrival patient tachycardic up to 150's, BP elevated but quickly normalized, RR 20, satting 100% on room air, afebrile.  Admission labs showed WBC count 11.4K, lactate 3.9, Cr 1.43, Na 129, Glc 555, AG 34, large acetone, bicarb 5.5 (6.9 on VBG), phos 1.2, Mg 1.7, Bcx's 1 of 2 staph not aureus and likely contaminant.  Covid +, flu negative.  EKG showed sinus tachycardia, SHAKILA, nonspecific ST abnormality.  CXR w/o acute cardiopulmonary processes.  Patient was started on insulin gtt in ER and gap had resolved, allowed her to eat, though appears her gap has re-opened, she reportedly feels better, mentating appropriately, have asked ER to give 10U SQ Levemir now and add mealtime insulin and will evaluate next BMP on need to be placed back on insulin gtt.  Hospital medicine consulted for further workup and admission.  Patient endorses 1  "day history nausea and vomiting, reports she \"may have missed\" a dose of her long acting insulin but was taking her mealtime insulin, reports she is not vaccinated, reports her grandmother is currently sick as well and this is who she lives with.   ---------------------------------------------------------------------------------------------------------------------   Review of Systems   Constitutional: Negative for chills, diaphoresis, fatigue and fever.   HENT: Positive for congestion and rhinorrhea.    Eyes: Negative.    Respiratory: Negative.    Cardiovascular: Negative.    Gastrointestinal: Positive for nausea and vomiting. Negative for abdominal pain.   Endocrine: Negative.    Genitourinary: Negative.    Musculoskeletal: Negative.    Skin: Negative.    Allergic/Immunologic: Negative.    Neurological: Negative.    Hematological: Negative.    Psychiatric/Behavioral: Negative.      ---------------------------------------------------------------------------------------------------------------------   Past Medical History:   Diagnosis Date   • Diabetes (HCC)      Past Surgical History:   Procedure Laterality Date   • TONSILLECTOMY       Family History   Problem Relation Age of Onset   • Diabetes Mother    • Heart disease Paternal Grandmother      Social History     Socioeconomic History   • Marital status: Single   Tobacco Use   • Smoking status: Never Smoker   • Smokeless tobacco: Never Used   Substance and Sexual Activity   • Alcohol use: Not Currently     ---------------------------------------------------------------------------------------------------------------------   Allergies:  Patient has no known allergies.  ---------------------------------------------------------------------------------------------------------------------   Medications below are reported home medications pulling from within the system; at this time, these medications have not been reconciled unless otherwise specified and are in the " verification process for further verifcation as current home medications.    Prior to Admission Medications     Prescriptions Last Dose Informant Patient Reported? Taking?    insulin glargine (LANTUS, SEMGLEE) 100 UNIT/ML injection 1/16/2022 Pharmacy Yes Yes    Inject 25 Units under the skin into the appropriate area as directed Daily.    insulin lispro (humaLOG) 100 UNIT/ML injection 1/17/2022 Self Yes Yes    Inject 15-20 Units under the skin into the appropriate area as directed 3 (Three) Times a Day Before Meals.    atorvastatin (LIPITOR) 10 MG tablet Unknown Pharmacy Yes No    Take 10 mg by mouth Daily.        Objective     Vital Signs:  Heart Rate:  [106-142] 107  BP: (109-149)/(59-98) 133/85    Mean Arterial Pressure (Non-Invasive) for the past 24 hrs (Last 3 readings):   Noninvasive MAP (mmHg)   01/18/22 0317 99   01/18/22 0302 112   01/18/22 0247 90     SpO2:  [97 %-100 %] 100 %  on   ;      Body mass index is 27.44 kg/m².    Wt Readings from Last 3 Encounters:   01/17/22 77.1 kg (170 lb)   01/11/22 77.4 kg (170 lb 11.2 oz)      ---------------------------------------------------------------------------------------------------------------------   Physical Exam:  Constitutional:  Well-developed and well-nourished.  No acute distress. Mild discomfort.  HENT:  Head: Normocephalic and atraumatic.  Mouth:  Moist mucous membranes.    Eyes:  Conjunctivae and EOM are normal.  No scleral icterus.  Neck:  Neck supple.  No JVD present.    Cardiovascular:  Normal rate, regular rhythm and normal heart sounds with no murmur.  Pulmonary/Chest:  No respiratory distress, no wheezes, on room air  Abdominal:  Soft.  Bowel sounds are normal.  No distension and no tenderness.   Musculoskeletal:  No tenderness, and no deformity.  No red or swollen joints anywhere.    Neurological:  Alert and oriented to person, place, and time.  No cranial nerve deficit.  No tongue deviation.  No facial droop.  No slurred speech.   Skin:  Skin is  warm and dry.  No rash noted.  No pallor.   Peripheral vascular:  No edema and pulses on all 4 extremities.  ---------------------------------------------------------------------------------------------------------------------  EKG:   Sinus tachycardia, nonspecific ST abnormality    Telemetry:    Regular rate and rhythm    I have personally looked at both the EKG and the telemetry strips.    Last echocardiogram:  ordered and pending, will review  --------------------------------------------------------------------------------------------------------------------  Labs:  Results from last 7 days   Lab Units 01/18/22  0656 01/17/22  1751 01/17/22  1640   LACTATE mmol/L 1.1 3.9* 4.5*   WBC 10*3/mm3 7.26 11.41*  --    HEMOGLOBIN g/dL 13.6 15.5  --    HEMATOCRIT % 40.2 49.5*  --    MCV fL 94.1 100.6*  --    MCHC g/dL 33.8 31.3*  --    PLATELETS 10*3/mm3 306 396  --          Results from last 7 days   Lab Units 01/18/22  1612 01/18/22  1203 01/18/22  0756 01/18/22  0402 01/17/22  1751   SODIUM mmol/L 133* 132* 132*   < > 129*   POTASSIUM mmol/L 3.7 3.9 3.9   < > 5.0   MAGNESIUM mg/dL 1.8 1.8 1.7   < > 1.9   CHLORIDE mmol/L 101 102 103   < > 89*   CO2 mmol/L 15.6* 13.8* 15.6*   < > 5.5*   BUN mg/dL 11 10 10   < > 19   CREATININE mg/dL 0.83 0.81 0.85   < > 1.43*   EGFR IF NONAFRICN AM mL/min/1.73 78 80 76   < > 42*   CALCIUM mg/dL 8.4* 8.4* 8.2*   < > 8.6   PHOSPHORUS mg/dL 2.3* 1.8* 1.1*   < > 3.8   GLUCOSE mg/dL 277* 229* 158*   < > 555*   ALBUMIN g/dL  --   --   --   --  4.67   BILIRUBIN mg/dL  --   --   --   --  0.3   ALK PHOS U/L  --   --   --   --  186*   AST (SGOT) U/L  --   --   --   --  84*   ALT (SGPT) U/L  --   --   --   --  67*    < > = values in this interval not displayed.   Estimated Creatinine Clearance: 99.2 mL/min (by C-G formula based on SCr of 0.83 mg/dL).    No results found for: AMMONIA  Results from last 7 days   Lab Units 01/17/22  1751   TROPONIN T ng/mL <0.010         Hemoglobin A1C   Date/Time  Value Ref Range Status   01/17/2022 1751 9.00 (H) 4.80 - 5.60 % Final     Glucose   Date/Time Value Ref Range Status   01/18/2022 1615 264 (H) 70 - 130 mg/dL Final     Comment:     Meter: [id number] : [id number] HERMELINDO MCINTOSH   01/18/2022 1510 252 (H) 70 - 130 mg/dL Final     Comment:     Meter: [id number] : [id number] HERMELINDO MCINTOSH   01/18/2022 1429 216 (H) 70 - 130 mg/dL Final     Comment:     Meter: [id number] : [id number] marco antonio elliott   01/18/2022 1259 209 (H) 70 - 130 mg/dL Final     Comment:     Meter: [id number] : [id number] HERMELINDO MCINTOSH   01/18/2022 1211 244 (H) 70 - 130 mg/dL Final     Comment:     Meter: [id number] : [id number] HERMELINDO DAYNA   01/18/2022 1102 201 (H) 70 - 130 mg/dL Final     Comment:     Meter: [id number] : [id number] bernard lexi   01/18/2022 0954 188 (H) 70 - 130 mg/dL Final     Comment:     Meter: [id number] : [id number] HERMELINDO DAYNA   01/18/2022 0901 167 (H) 70 - 130 mg/dL Final     Comment:     Meter: [id number] : [id number] HERMELINDO MCINTOSH     No results found for: TSH, FREET4  Lab Results   Component Value Date    PREGTESTUR Negative 01/10/2022     Pain Management Panel    There is no flowsheet data to display.       Brief Urine Lab Results  (Last result in the past 365 days)      Color   Clarity   Blood   Leuk Est   Nitrite   Protein   CREAT   Urine HCG        01/10/22 1402               Negative           Blood Culture   Date Value Ref Range Status   01/17/2022 Abnormal Stain (C)  Preliminary   01/17/2022 No growth at 24 hours  Preliminary     No results found for: URINECX  No results found for: WOUNDCX  No results found for: STOOLCX    I have personally looked at the labs and they are summarized above.  ----------------------------------------------------------------------------------------------------------------------  Detailed radiology reports for the last 24 hours:    Imaging Results (Last 24 Hours)     Procedure Component Value Units Date/Time    XR  Chest 1 View [[id number]] Collected: 01/17/22 1704     Updated: 01/17/22 1717    Narrative:      EXAM:    XR Chest, 1 View     EXAM DATE:    1/17/2022 4:12 PM     CLINICAL HISTORY:    DKA     TECHNIQUE:    Frontal view of the chest.     COMPARISON:    01/08/2022     FINDINGS:    Lungs:  Unremarkable.  No consolidation.    Pleural space:  Unremarkable.  No pneumothorax.    Heart:  Unremarkable.  No cardiomegaly.    Mediastinum:  Unremarkable.    Bones/joints:  Unremarkable.       Impression:        Unremarkable exam. No acute cardiopulmonary findings identified.     This report was finalized on 1/17/2022 5:04 PM by Dr. Inocencio Smith MD.           Final impressions for the last 30 days of radiology reports:    CT Abdomen Pelvis With Contrast    Result Date: 1/9/2022  1. Mild thickening of the urinary bladder wall may be due to incomplete distention or cystitis. 2. No definite acute findings in the GI tract. The appendix is normal. 3. Small uterine fibroid. 4. Nonobstructed kidneys. Signer Name: Jean Claude Tristan MD  Signed: 1/9/2022 2:48 AM  Workstation Name: [facility]  [facility]    XR Chest 1 View    Result Date: 1/17/2022    Unremarkable exam. No acute cardiopulmonary findings identified.  This report was finalized on 1/17/2022 5:04 PM by Dr. Inocencio Smith MD.      XR Chest 1 View    Result Date: 1/8/2022  No acute cardiopulmonary findings. Signer Name: Jean Claude Falk MD  Signed: 1/8/2022 9:40 PM  Workstation Name: [id number]  [facility]    CT Chest Pulmonary Embolism    Result Date: 1/9/2022  1. No PE or aortic dissection. 2. Negative for pneumonia. Signer Name: Jean Claude Tristan MD  Signed: 1/9/2022 2:44 AM  Workstation Name: [facility]  [facility]    I have personally looked at the radiology images and read the final radiology report.    Assessment & Plan    36F PMH IDDM Type I, recently admitted this facility 1/10-11 for Sepsis, UTI, DKA,  presents w/ nausea and vomiting, found to be again in DKA but also this time testing positive for Covid 19.     #DKA in setting of IDDM Type II, uncontrolled, unknown complications, recent Hx DKA  #Lactic Acidosis - Resolved  - Hgb A1c = 9.0%  - Patient presented w/ nausea, vomiting, increased glucose, gap open, underlying viral infection, recent Hx DKA this month.  - Admission labs showed Glc 555, AG 34, Bicarb 5.5, Acetone large, Bcx's pending  - CXR w/o acute cardiopulmonary findings.  - Will trend Glc q1hr initially, trend BMP q4hrs, trend Mg and Phos as well  - Will replace isotonic fluids and correct electrolytes per protocol  - Given gap was closed and already eating will attempt to transition to SQ insulin but if needed will restart insulin gtt.  - Will admit to CCU as PCU overflow, monitor on telemetry, trend labs and treat per protocol.    #Covid 19, Asymptomatic  - Have ordered TTE for cardiac function baseline  - Have ordered b/l Venous Duplex to rule out DVT  - Does not meet criteria for Remdesivir or Steroids at this time  - Continue Level I AC for Covid 19 thromboembolism PPx  - APAP PRN for fevers if needed  - Trend Covid 19 progression labs w/ CBC, CMP, D-dimer daily   - Continue enhanced droplet/contact isolation precautions, monitor for need to initiate 02    #Non-Oliguric TAMAR 2/2 DKA - Resolved  - B/l Cr NML, was up to 1.4 on admission; Now 0.8  - Continue mIVFs per protocol, Trend Cr and UOP, avoid nephrotoxins, NSAIDs, dehydration and contrast as able.    #Electrolyte Abnormalities  - Borderline Hypomagnesemia - Mg 1.7, Replace per protocol  - Pseudohyponatremia - Na 129 on admission, improving w/ treatment DKA, now 132  - Acute Mild Hypophosphatemia - Phos 1.1, Replace per protocol    F: Oral  E: Monitor & Replace PRN  N: DM diet  Ppx: Plov (Level I AC)  Code: Full Code     Dispo: Pending workup and clinical improvement     *This patient is considered high risk due to DKA, Covid 19, lactic  acidosis, TAMAR.    VTE Prophylaxis:   Mechanical Order History:     None      Pharmalogical Order History:      Ordered     Dose Route Frequency Stop    Signed and Held  enoxaparin (LOVENOX) syringe 40 mg         40 mg SC Every 24 Hours --              Santiago Jones MD  [facility]  01/18/22  17:08 EST

## 2022-01-18 NOTE — ED NOTES
Pt glucose 188 PARADISE Navas notified     Michael Cadet, PCT  01/18/22 0957       Michael Cadet, PCT  01/18/22 1300

## 2022-01-19 ENCOUNTER — APPOINTMENT (OUTPATIENT)
Dept: ULTRASOUND IMAGING | Facility: HOSPITAL | Age: 37
End: 2022-01-19

## 2022-01-19 ENCOUNTER — APPOINTMENT (OUTPATIENT)
Dept: CARDIOLOGY | Facility: HOSPITAL | Age: 37
End: 2022-01-19

## 2022-01-19 LAB
ACETONE BLD QL: ABNORMAL
ACETONE BLD QL: NEGATIVE
ALBUMIN SERPL-MCNC: 3.56 G/DL (ref 3.5–5.2)
ALBUMIN/GLOB SERPL: 1.4 G/DL
ALP SERPL-CCNC: 136 U/L (ref 39–117)
ALT SERPL W P-5'-P-CCNC: 43 U/L (ref 1–33)
ANION GAP SERPL CALCULATED.3IONS-SCNC: 11.6 MMOL/L (ref 5–15)
ANION GAP SERPL CALCULATED.3IONS-SCNC: 14 MMOL/L (ref 5–15)
ANION GAP SERPL CALCULATED.3IONS-SCNC: 14.2 MMOL/L (ref 5–15)
ANION GAP SERPL CALCULATED.3IONS-SCNC: 15.6 MMOL/L (ref 5–15)
ANION GAP SERPL CALCULATED.3IONS-SCNC: 15.6 MMOL/L (ref 5–15)
AST SERPL-CCNC: 56 U/L (ref 1–32)
BACTERIA SPEC AEROBE CULT: ABNORMAL
BASOPHILS # BLD AUTO: 0.01 10*3/MM3 (ref 0–0.2)
BASOPHILS NFR BLD AUTO: 0.2 % (ref 0–1.5)
BH CV ECHO MEAS - % IVS THICK: 6.4 %
BH CV ECHO MEAS - % LVPW THICK: -0.84 %
BH CV ECHO MEAS - ACS: 2.6 CM
BH CV ECHO MEAS - AO MAX PG: 11 MMHG
BH CV ECHO MEAS - AO MEAN PG: 5 MMHG
BH CV ECHO MEAS - AO ROOT AREA (BSA CORRECTED): 1.9
BH CV ECHO MEAS - AO ROOT AREA: 8.6 CM^2
BH CV ECHO MEAS - AO ROOT DIAM: 3.3 CM
BH CV ECHO MEAS - AO V2 MAX: 166 CM/SEC
BH CV ECHO MEAS - AO V2 MEAN: 108 CM/SEC
BH CV ECHO MEAS - AO V2 VTI: 29.5 CM
BH CV ECHO MEAS - BSA(HAYCOCK): 1.7 M^2
BH CV ECHO MEAS - BSA: 1.7 M^2
BH CV ECHO MEAS - BZI_BMI: 22.8 KILOGRAMS/M^2
BH CV ECHO MEAS - BZI_METRIC_HEIGHT: 167.6 CM
BH CV ECHO MEAS - BZI_METRIC_WEIGHT: 64 KG
BH CV ECHO MEAS - EDV(CUBED): 84.6 ML
BH CV ECHO MEAS - EDV(MOD-SP4): 62 ML
BH CV ECHO MEAS - EDV(TEICH): 87.2 ML
BH CV ECHO MEAS - EF(CUBED): 70.1 %
BH CV ECHO MEAS - EF(MOD-SP4): 68.7 %
BH CV ECHO MEAS - EF(TEICH): 62 %
BH CV ECHO MEAS - ESV(CUBED): 25.3 ML
BH CV ECHO MEAS - ESV(MOD-SP4): 19.4 ML
BH CV ECHO MEAS - ESV(TEICH): 33.2 ML
BH CV ECHO MEAS - FS: 33.1 %
BH CV ECHO MEAS - IVS/LVPW: 0.93
BH CV ECHO MEAS - IVSD: 0.94 CM
BH CV ECHO MEAS - IVSS: 1 CM
BH CV ECHO MEAS - LA DIMENSION: 3 CM
BH CV ECHO MEAS - LA/AO: 0.91
BH CV ECHO MEAS - LV DIASTOLIC VOL/BSA (35-75): 36 ML/M^2
BH CV ECHO MEAS - LV MASS(C)D: 141.8 GRAMS
BH CV ECHO MEAS - LV MASS(C)DI: 82.3 GRAMS/M^2
BH CV ECHO MEAS - LV MASS(C)S: 79.4 GRAMS
BH CV ECHO MEAS - LV MASS(C)SI: 46.1 GRAMS/M^2
BH CV ECHO MEAS - LV SYSTOLIC VOL/BSA (12-30): 11.3 ML/M^2
BH CV ECHO MEAS - LVIDD: 4.4 CM
BH CV ECHO MEAS - LVIDS: 2.9 CM
BH CV ECHO MEAS - LVLD AP4: 7 CM
BH CV ECHO MEAS - LVLS AP4: 5.3 CM
BH CV ECHO MEAS - LVOT AREA (M): 2.8 CM^2
BH CV ECHO MEAS - LVOT AREA: 2.8 CM^2
BH CV ECHO MEAS - LVOT DIAM: 1.9 CM
BH CV ECHO MEAS - LVPWD: 1 CM
BH CV ECHO MEAS - LVPWS: 1 CM
BH CV ECHO MEAS - MV A MAX VEL: 84.4 CM/SEC
BH CV ECHO MEAS - MV E MAX VEL: 99 CM/SEC
BH CV ECHO MEAS - MV E/A: 1.2
BH CV ECHO MEAS - PA ACC TIME: 0.1 SEC
BH CV ECHO MEAS - PA PR(ACCEL): 36.3 MMHG
BH CV ECHO MEAS - SI(AO): 146.4 ML/M^2
BH CV ECHO MEAS - SI(CUBED): 34.4 ML/M^2
BH CV ECHO MEAS - SI(MOD-SP4): 24.7 ML/M^2
BH CV ECHO MEAS - SI(TEICH): 31.3 ML/M^2
BH CV ECHO MEAS - SV(AO): 252.3 ML
BH CV ECHO MEAS - SV(CUBED): 59.3 ML
BH CV ECHO MEAS - SV(MOD-SP4): 42.6 ML
BH CV ECHO MEAS - SV(TEICH): 54 ML
BILIRUB SERPL-MCNC: 0.4 MG/DL (ref 0–1.2)
BUN SERPL-MCNC: 5 MG/DL (ref 6–20)
BUN SERPL-MCNC: 6 MG/DL (ref 6–20)
BUN SERPL-MCNC: 7 MG/DL (ref 6–20)
BUN/CREAT SERPL: 10 (ref 7–25)
BUN/CREAT SERPL: 10.3 (ref 7–25)
BUN/CREAT SERPL: 8.3 (ref 7–25)
BUN/CREAT SERPL: 9.1 (ref 7–25)
BUN/CREAT SERPL: 9.8 (ref 7–25)
CALCIUM SPEC-SCNC: 7.8 MG/DL (ref 8.6–10.5)
CALCIUM SPEC-SCNC: 7.8 MG/DL (ref 8.6–10.5)
CALCIUM SPEC-SCNC: 7.9 MG/DL (ref 8.6–10.5)
CALCIUM SPEC-SCNC: 8.1 MG/DL (ref 8.6–10.5)
CALCIUM SPEC-SCNC: 8.3 MG/DL (ref 8.6–10.5)
CHLORIDE SERPL-SCNC: 100 MMOL/L (ref 98–107)
CHLORIDE SERPL-SCNC: 100 MMOL/L (ref 98–107)
CHLORIDE SERPL-SCNC: 101 MMOL/L (ref 98–107)
CHLORIDE SERPL-SCNC: 102 MMOL/L (ref 98–107)
CHLORIDE SERPL-SCNC: 99 MMOL/L (ref 98–107)
CO2 SERPL-SCNC: 17.4 MMOL/L (ref 22–29)
CO2 SERPL-SCNC: 17.4 MMOL/L (ref 22–29)
CO2 SERPL-SCNC: 17.8 MMOL/L (ref 22–29)
CO2 SERPL-SCNC: 19 MMOL/L (ref 22–29)
CO2 SERPL-SCNC: 19.4 MMOL/L (ref 22–29)
CREAT SERPL-MCNC: 0.58 MG/DL (ref 0.57–1)
CREAT SERPL-MCNC: 0.6 MG/DL (ref 0.57–1)
CREAT SERPL-MCNC: 0.61 MG/DL (ref 0.57–1)
CREAT SERPL-MCNC: 0.66 MG/DL (ref 0.57–1)
CREAT SERPL-MCNC: 0.7 MG/DL (ref 0.57–1)
D DIMER PPP FEU-MCNC: 1.88 MCGFEU/ML (ref 0–0.5)
DEPRECATED RDW RBC AUTO: 48.9 FL (ref 37–54)
EOSINOPHIL # BLD AUTO: 0 10*3/MM3 (ref 0–0.4)
EOSINOPHIL NFR BLD AUTO: 0 % (ref 0.3–6.2)
ERYTHROCYTE [DISTWIDTH] IN BLOOD BY AUTOMATED COUNT: 14.1 % (ref 12.3–15.4)
GFR SERPL CREATININE-BSD FRML MDRD: 102 ML/MIN/1.73
GFR SERPL CREATININE-BSD FRML MDRD: 112 ML/MIN/1.73
GFR SERPL CREATININE-BSD FRML MDRD: 114 ML/MIN/1.73
GFR SERPL CREATININE-BSD FRML MDRD: 118 ML/MIN/1.73
GFR SERPL CREATININE-BSD FRML MDRD: 95 ML/MIN/1.73
GLOBULIN UR ELPH-MCNC: 2.5 GM/DL
GLUCOSE BLDC GLUCOMTR-MCNC: 149 MG/DL (ref 70–130)
GLUCOSE BLDC GLUCOMTR-MCNC: 163 MG/DL (ref 70–130)
GLUCOSE BLDC GLUCOMTR-MCNC: 166 MG/DL (ref 70–130)
GLUCOSE BLDC GLUCOMTR-MCNC: 173 MG/DL (ref 70–130)
GLUCOSE BLDC GLUCOMTR-MCNC: 174 MG/DL (ref 70–130)
GLUCOSE BLDC GLUCOMTR-MCNC: 176 MG/DL (ref 70–130)
GLUCOSE BLDC GLUCOMTR-MCNC: 180 MG/DL (ref 70–130)
GLUCOSE BLDC GLUCOMTR-MCNC: 200 MG/DL (ref 70–130)
GLUCOSE BLDC GLUCOMTR-MCNC: 242 MG/DL (ref 70–130)
GLUCOSE SERPL-MCNC: 161 MG/DL (ref 65–99)
GLUCOSE SERPL-MCNC: 187 MG/DL (ref 65–99)
GLUCOSE SERPL-MCNC: 195 MG/DL (ref 65–99)
GLUCOSE SERPL-MCNC: 254 MG/DL (ref 65–99)
GLUCOSE SERPL-MCNC: 259 MG/DL (ref 65–99)
GRAM STN SPEC: ABNORMAL
HCT VFR BLD AUTO: 35.8 % (ref 34–46.6)
HGB BLD-MCNC: 12.2 G/DL (ref 12–15.9)
IMM GRANULOCYTES # BLD AUTO: 0.02 10*3/MM3 (ref 0–0.05)
IMM GRANULOCYTES NFR BLD AUTO: 0.4 % (ref 0–0.5)
ISOLATED FROM: ABNORMAL
LV EF 2D ECHO EST: 70 %
LYMPHOCYTES # BLD AUTO: 0.75 10*3/MM3 (ref 0.7–3.1)
LYMPHOCYTES NFR BLD AUTO: 15.6 % (ref 19.6–45.3)
MAGNESIUM SERPL-MCNC: 1.7 MG/DL (ref 1.6–2.6)
MAGNESIUM SERPL-MCNC: 1.7 MG/DL (ref 1.6–2.6)
MAGNESIUM SERPL-MCNC: 1.8 MG/DL (ref 1.6–2.6)
MAGNESIUM SERPL-MCNC: 1.8 MG/DL (ref 1.6–2.6)
MAGNESIUM SERPL-MCNC: 1.9 MG/DL (ref 1.6–2.6)
MAXIMAL PREDICTED HEART RATE: 185 BPM
MCH RBC QN AUTO: 32 PG (ref 26.6–33)
MCHC RBC AUTO-ENTMCNC: 34.1 G/DL (ref 31.5–35.7)
MCV RBC AUTO: 94 FL (ref 79–97)
MONOCYTES # BLD AUTO: 0.81 10*3/MM3 (ref 0.1–0.9)
MONOCYTES NFR BLD AUTO: 16.9 % (ref 5–12)
NEUTROPHILS NFR BLD AUTO: 3.21 10*3/MM3 (ref 1.7–7)
NEUTROPHILS NFR BLD AUTO: 66.9 % (ref 42.7–76)
NRBC BLD AUTO-RTO: 0 /100 WBC (ref 0–0.2)
PHOSPHATE SERPL-MCNC: 1 MG/DL (ref 2.5–4.5)
PHOSPHATE SERPL-MCNC: 1.2 MG/DL (ref 2.5–4.5)
PHOSPHATE SERPL-MCNC: 1.6 MG/DL (ref 2.5–4.5)
PHOSPHATE SERPL-MCNC: 2.2 MG/DL (ref 2.5–4.5)
PHOSPHATE SERPL-MCNC: 2.9 MG/DL (ref 2.5–4.5)
PLATELET # BLD AUTO: 263 10*3/MM3 (ref 140–450)
PMV BLD AUTO: 9.3 FL (ref 6–12)
POTASSIUM SERPL-SCNC: 3.2 MMOL/L (ref 3.5–5.2)
POTASSIUM SERPL-SCNC: 3.3 MMOL/L (ref 3.5–5.2)
POTASSIUM SERPL-SCNC: 3.5 MMOL/L (ref 3.5–5.2)
POTASSIUM SERPL-SCNC: 3.5 MMOL/L (ref 3.5–5.2)
POTASSIUM SERPL-SCNC: 4.1 MMOL/L (ref 3.5–5.2)
PROT SERPL-MCNC: 6.1 G/DL (ref 6–8.5)
QT INTERVAL: 290 MS
QTC INTERVAL: 433 MS
RBC # BLD AUTO: 3.81 10*6/MM3 (ref 3.77–5.28)
SODIUM SERPL-SCNC: 131 MMOL/L (ref 136–145)
SODIUM SERPL-SCNC: 133 MMOL/L (ref 136–145)
SODIUM SERPL-SCNC: 134 MMOL/L (ref 136–145)
STRESS TARGET HR: 157 BPM
WBC NRBC COR # BLD: 4.8 10*3/MM3 (ref 3.4–10.8)

## 2022-01-19 PROCEDURE — 80053 COMPREHEN METABOLIC PANEL: CPT | Performed by: INTERNAL MEDICINE

## 2022-01-19 PROCEDURE — 99232 SBSQ HOSP IP/OBS MODERATE 35: CPT | Performed by: INTERNAL MEDICINE

## 2022-01-19 PROCEDURE — 83735 ASSAY OF MAGNESIUM: CPT | Performed by: INTERNAL MEDICINE

## 2022-01-19 PROCEDURE — 63710000001 INSULIN DETEMIR PER 5 UNITS: Performed by: INTERNAL MEDICINE

## 2022-01-19 PROCEDURE — 25010000002 ENOXAPARIN PER 10 MG: Performed by: INTERNAL MEDICINE

## 2022-01-19 PROCEDURE — 93970 EXTREMITY STUDY: CPT

## 2022-01-19 PROCEDURE — 87040 BLOOD CULTURE FOR BACTERIA: CPT | Performed by: INTERNAL MEDICINE

## 2022-01-19 PROCEDURE — 93970 EXTREMITY STUDY: CPT | Performed by: RADIOLOGY

## 2022-01-19 PROCEDURE — 82009 KETONE BODYS QUAL: CPT | Performed by: INTERNAL MEDICINE

## 2022-01-19 PROCEDURE — 85379 FIBRIN DEGRADATION QUANT: CPT | Performed by: INTERNAL MEDICINE

## 2022-01-19 PROCEDURE — 93306 TTE W/DOPPLER COMPLETE: CPT | Performed by: INTERNAL MEDICINE

## 2022-01-19 PROCEDURE — 82009 KETONE BODYS QUAL: CPT | Performed by: PHYSICIAN ASSISTANT

## 2022-01-19 PROCEDURE — 82962 GLUCOSE BLOOD TEST: CPT

## 2022-01-19 PROCEDURE — 84100 ASSAY OF PHOSPHORUS: CPT | Performed by: INTERNAL MEDICINE

## 2022-01-19 PROCEDURE — 93306 TTE W/DOPPLER COMPLETE: CPT

## 2022-01-19 PROCEDURE — 85025 COMPLETE CBC W/AUTO DIFF WBC: CPT | Performed by: INTERNAL MEDICINE

## 2022-01-19 PROCEDURE — 63710000001 INSULIN ASPART PER 5 UNITS: Performed by: INTERNAL MEDICINE

## 2022-01-19 RX ORDER — SODIUM CHLORIDE 9 MG/ML
75 INJECTION, SOLUTION INTRAVENOUS CONTINUOUS
Status: DISCONTINUED | OUTPATIENT
Start: 2022-01-19 | End: 2022-01-19

## 2022-01-19 RX ADMIN — SODIUM CHLORIDE, PRESERVATIVE FREE 3 ML: 5 INJECTION INTRAVENOUS at 00:22

## 2022-01-19 RX ADMIN — SODIUM CHLORIDE, PRESERVATIVE FREE 10 ML: 5 INJECTION INTRAVENOUS at 20:11

## 2022-01-19 RX ADMIN — ENOXAPARIN SODIUM 40 MG: 40 INJECTION SUBCUTANEOUS at 18:42

## 2022-01-19 RX ADMIN — POTASSIUM PHOSPHATE, MONOBASIC AND POTASSIUM PHOSPHATE, DIBASIC 45 MMOL: 224; 236 INJECTION, SOLUTION, CONCENTRATE INTRAVENOUS at 11:49

## 2022-01-19 RX ADMIN — SODIUM CHLORIDE, PRESERVATIVE FREE 3 ML: 5 INJECTION INTRAVENOUS at 08:25

## 2022-01-19 RX ADMIN — INSULIN DETEMIR 15 UNITS: 100 INJECTION, SOLUTION SUBCUTANEOUS at 14:56

## 2022-01-19 RX ADMIN — INSULIN ASPART 2 UNITS: 100 INJECTION, SOLUTION INTRAVENOUS; SUBCUTANEOUS at 17:19

## 2022-01-19 RX ADMIN — SODIUM CHLORIDE, PRESERVATIVE FREE 3 ML: 5 INJECTION INTRAVENOUS at 20:11

## 2022-01-19 RX ADMIN — SODIUM CHLORIDE, PRESERVATIVE FREE 10 ML: 5 INJECTION INTRAVENOUS at 08:23

## 2022-01-19 RX ADMIN — INSULIN ASPART 4 UNITS: 100 INJECTION, SOLUTION INTRAVENOUS; SUBCUTANEOUS at 11:44

## 2022-01-19 RX ADMIN — ATORVASTATIN CALCIUM 10 MG: 10 TABLET, FILM COATED ORAL at 08:25

## 2022-01-19 RX ADMIN — SODIUM CHLORIDE, PRESERVATIVE FREE 10 ML: 5 INJECTION INTRAVENOUS at 00:22

## 2022-01-19 RX ADMIN — INSULIN ASPART 2 UNITS: 100 INJECTION, SOLUTION INTRAVENOUS; SUBCUTANEOUS at 08:22

## 2022-01-19 NOTE — PAYOR COMM NOTE
"CONTACT:  HAMMAD ROTHMAN MSN, APRN  UTILIZATION MANAGEMENT DEPT.  [facility]  [address] [facility] [address]  PHONE:  [phone]  FAX: [phone]    INPATIENT AUTHORIZATION REQUEST.    Mary Jo Hahn (36 y.o. Female)             Date of Birth Social Security Number Address Home Phone MRN    1985  [address]  [facility] [address] [phone] [id number]    [employer] Marital Status             None Single       Admission Date Admission Type Admitting Provider Attending Provider Department, Room/Bed    1/18/22 Emergency Santiago Jones MD Parks, Andrew, MD [facility] PROGRESS CARE, P204/S2    Discharge Date Discharge Disposition Discharge Destination                         Attending Provider: Santiago Jones MD    Allergies: No Known Allergies    Isolation: Enh Drop/Con   Infection: COVID (confirmed) (01/17/22)   Code Status: CPR   Advance Care Planning Activity    Ht: 167.6 cm (66\")   Wt: 64 kg (141 lb)    Admission Cmt: None   Principal Problem: None                Active Insurance as of 1/17/2022     Primary Coverage     Payor Plan Insurance Group Employer/Plan Group    [employer] [place] [employer] [place]ICA[place]      Payor Plan Address Payor Plan Phone Number Payor Plan Fax Number Effective Dates    [address] [address] [phone]  1/8/2022 - None Entered    [facility] [id number]       Subscriber Name Subscriber Birth Date Member ID       MARY JO HAHN 1985 [id number]                 Emergency Contacts      (Rel.) Home Phone Work Phone Mobile Phone    LISBET HAHN (Mother) [phone] -- --            Problem List           Codes Noted - Resolved       Hospital    DKA (diabetic ketoacidosis) (HCC) ICD-10-CM: E11.10  ICD-9-CM: 250.12 1/18/2022 - Present       Non-Hospital    Metabolic acidosis ICD-10-CM: E87.2  ICD-9-CM: 276.2 1/10/2022 - Present             History & Physical      Santiago Jones MD at 01/18/22 1707              [facility] HOSPITALIST HISTORY " AND PHYSICAL    Patient Identification:  Name:  Marta Hahn  Age:  36 y.o.  Sex:  female  :  1985  MRN:  [id number]   Admit Date: 2022   Visit Number:  [id number]  Room number:  116/16  Primary Care Physician:  Paul Nava MD     Subjective     Chief complaint:    Chief Complaint   Patient presents with   • Blood Sugar Problem     History of presenting illness:   Ms Hahn is 36F PMH IDDM Type I, recently admitted this facility 1/10-11 for Sepsis, UTI, DKA, was discharged home the following day after clinical and lab improvement, sent home on Macrobid for UTI w/ plans to f/u w/ her endocrinologist outpatient.  Patient was discharged on 25U Glargine and 15-20U TID before meals.  Today patient presents w/ nausea and vomiting, found to be again in DKA but also this time testing positive for Covid 19.  Patient denies any fevers or chills, denies any respiratory symptoms or dyspnea, is on room air, essentially asymptomatic from Covid 19 standpoint.  She denies any dysuria, urine cultures were not obtained prior admission.  On arrival patient tachycardic up to 150's, BP elevated but quickly normalized, RR 20, satting 100% on room air, afebrile.  Admission labs showed WBC count 11.4K, lactate 3.9, Cr 1.43, Na 129, Glc 555, AG 34, large acetone, bicarb 5.5 (6.9 on VBG), phos 1.2, Mg 1.7, Bcx's 1 of 2 staph not aureus and likely contaminant.  Covid +, flu negative.  EKG showed sinus tachycardia, SHAKILA, nonspecific ST abnormality.  CXR w/o acute cardiopulmonary processes.  Patient was started on insulin gtt in ER and gap had resolved, allowed her to eat, though appears her gap has re-opened, she reportedly feels better, mentating appropriately, have asked ER to give 10U SQ Levemir now and add mealtime insulin and will evaluate next BMP on need to be placed back on insulin gtt.  Hospital medicine consulted for further workup and admission.  Patient endorses 1 day history nausea and vomiting, reports she  "\"may have missed\" a dose of her long acting insulin but was taking her mealtime insulin, reports she is not vaccinated, reports her grandmother is currently sick as well and this is who she lives with.   ---------------------------------------------------------------------------------------------------------------------   Review of Systems   Constitutional: Negative for chills, diaphoresis, fatigue and fever.   HENT: Positive for congestion and rhinorrhea.    Eyes: Negative.    Respiratory: Negative.    Cardiovascular: Negative.    Gastrointestinal: Positive for nausea and vomiting. Negative for abdominal pain.   Endocrine: Negative.    Genitourinary: Negative.    Musculoskeletal: Negative.    Skin: Negative.    Allergic/Immunologic: Negative.    Neurological: Negative.    Hematological: Negative.    Psychiatric/Behavioral: Negative.      ---------------------------------------------------------------------------------------------------------------------   Past Medical History:   Diagnosis Date   • Diabetes (HCC)      Past Surgical History:   Procedure Laterality Date   • TONSILLECTOMY       Family History   Problem Relation Age of Onset   • Diabetes Mother    • Heart disease Paternal Grandmother      Social History     Socioeconomic History   • Marital status: Single   Tobacco Use   • Smoking status: Never Smoker   • Smokeless tobacco: Never Used   Substance and Sexual Activity   • Alcohol use: Not Currently     ---------------------------------------------------------------------------------------------------------------------   Allergies:  Patient has no known allergies.  ---------------------------------------------------------------------------------------------------------------------   Medications below are reported home medications pulling from within the system; at this time, these medications have not been reconciled unless otherwise specified and are in the verification process for further verifcation as " current home medications.    Prior to Admission Medications     Prescriptions Last Dose Informant Patient Reported? Taking?    insulin glargine (LANTUS, SEMGLEE) 100 UNIT/ML injection 1/16/2022 Pharmacy Yes Yes    Inject 25 Units under the skin into the appropriate area as directed Daily.    insulin lispro (humaLOG) 100 UNIT/ML injection 1/17/2022 Self Yes Yes    Inject 15-20 Units under the skin into the appropriate area as directed 3 (Three) Times a Day Before Meals.    atorvastatin (LIPITOR) 10 MG tablet Unknown Pharmacy Yes No    Take 10 mg by mouth Daily.        Objective     Vital Signs:  Heart Rate:  [106-142] 107  BP: (109-149)/(59-98) 133/85    Mean Arterial Pressure (Non-Invasive) for the past 24 hrs (Last 3 readings):   Noninvasive MAP (mmHg)   01/18/22 0317 99   01/18/22 0302 112   01/18/22 0247 90     SpO2:  [97 %-100 %] 100 %  on   ;      Body mass index is 27.44 kg/m².    Wt Readings from Last 3 Encounters:   01/17/22 77.1 kg (170 lb)   01/11/22 77.4 kg (170 lb 11.2 oz)      ---------------------------------------------------------------------------------------------------------------------   Physical Exam:  Constitutional:  Well-developed and well-nourished.  No acute distress. Mild discomfort.  HENT:  Head: Normocephalic and atraumatic.  Mouth:  Moist mucous membranes.    Eyes:  Conjunctivae and EOM are normal.  No scleral icterus.  Neck:  Neck supple.  No JVD present.    Cardiovascular:  Normal rate, regular rhythm and normal heart sounds with no murmur.  Pulmonary/Chest:  No respiratory distress, no wheezes, on room air  Abdominal:  Soft.  Bowel sounds are normal.  No distension and no tenderness.   Musculoskeletal:  No tenderness, and no deformity.  No red or swollen joints anywhere.    Neurological:  Alert and oriented to person, place, and time.  No cranial nerve deficit.  No tongue deviation.  No facial droop.  No slurred speech.   Skin:  Skin is warm and dry.  No rash noted.  No pallor.    Peripheral vascular:  No edema and pulses on all 4 extremities.  ---------------------------------------------------------------------------------------------------------------------  EKG:   Sinus tachycardia, nonspecific ST abnormality    Telemetry:    Regular rate and rhythm    I have personally looked at both the EKG and the telemetry strips.    Last echocardiogram:  ordered and pending, will review  --------------------------------------------------------------------------------------------------------------------  Labs:  Results from last 7 days   Lab Units 01/18/22  0656 01/17/22  1751 01/17/22  1640   LACTATE mmol/L 1.1 3.9* 4.5*   WBC 10*3/mm3 7.26 11.41*  --    HEMOGLOBIN g/dL 13.6 15.5  --    HEMATOCRIT % 40.2 49.5*  --    MCV fL 94.1 100.6*  --    MCHC g/dL 33.8 31.3*  --    PLATELETS 10*3/mm3 306 396  --          Results from last 7 days   Lab Units 01/18/22  1612 01/18/22  1203 01/18/22  0756 01/18/22  0402 01/17/22  1751   SODIUM mmol/L 133* 132* 132*   < > 129*   POTASSIUM mmol/L 3.7 3.9 3.9   < > 5.0   MAGNESIUM mg/dL 1.8 1.8 1.7   < > 1.9   CHLORIDE mmol/L 101 102 103   < > 89*   CO2 mmol/L 15.6* 13.8* 15.6*   < > 5.5*   BUN mg/dL 11 10 10   < > 19   CREATININE mg/dL 0.83 0.81 0.85   < > 1.43*   EGFR IF NONAFRICN AM mL/min/1.73 78 80 76   < > 42*   CALCIUM mg/dL 8.4* 8.4* 8.2*   < > 8.6   PHOSPHORUS mg/dL 2.3* 1.8* 1.1*   < > 3.8   GLUCOSE mg/dL 277* 229* 158*   < > 555*   ALBUMIN g/dL  --   --   --   --  4.67   BILIRUBIN mg/dL  --   --   --   --  0.3   ALK PHOS U/L  --   --   --   --  186*   AST (SGOT) U/L  --   --   --   --  84*   ALT (SGPT) U/L  --   --   --   --  67*    < > = values in this interval not displayed.   Estimated Creatinine Clearance: 99.2 mL/min (by C-G formula based on SCr of 0.83 mg/dL).    No results found for: AMMONIA  Results from last 7 days   Lab Units 01/17/22  1751   TROPONIN T ng/mL <0.010         Hemoglobin A1C   Date/Time Value Ref Range Status   01/17/2022 1751 9.00  (H) 4.80 - 5.60 % Final     Glucose   Date/Time Value Ref Range Status   01/18/2022 1615 264 (H) 70 - 130 mg/dL Final     Comment:     Meter: [id number] : [id number] HERMELINDO MCINTOSH   01/18/2022 1510 252 (H) 70 - 130 mg/dL Final     Comment:     Meter: [id number] : [id number] HERMELINDO MCINTOSH   01/18/2022 1429 216 (H) 70 - 130 mg/dL Final     Comment:     Meter: [id number] : [id number] marco antonio elliott   01/18/2022 1259 209 (H) 70 - 130 mg/dL Final     Comment:     Meter: [id number] : [id number] HERMELINDO MCINTOSH   01/18/2022 1211 244 (H) 70 - 130 mg/dL Final     Comment:     Meter: [id number] : [id number] HERMELINDO MCINTOSH   01/18/2022 1102 201 (H) 70 - 130 mg/dL Final     Comment:     Meter: [id number] : [id number] marco antonio elliott   01/18/2022 0954 188 (H) 70 - 130 mg/dL Final     Comment:     Meter: [id number] : [id number] HERMELINDO MCINTOSH   01/18/2022 0901 167 (H) 70 - 130 mg/dL Final     Comment:     Meter: [id number] : [id number] HERMELINDO MCINTOSH     No results found for: TSH, FREET4  Lab Results   Component Value Date    PREGTESTUR Negative 01/10/2022     Pain Management Panel    There is no flowsheet data to display.       Brief Urine Lab Results  (Last result in the past 365 days)      Color   Clarity   Blood   Leuk Est   Nitrite   Protein   CREAT   Urine HCG        01/10/22 1402               Negative           Blood Culture   Date Value Ref Range Status   01/17/2022 Abnormal Stain (C)  Preliminary   01/17/2022 No growth at 24 hours  Preliminary     No results found for: URINECX  No results found for: WOUNDCX  No results found for: STOOLCX    I have personally looked at the labs and they are summarized above.  ----------------------------------------------------------------------------------------------------------------------  Detailed radiology reports for the last 24 hours:    Imaging Results (Last 24 Hours)     Procedure Component Value Units Date/Time    XR Chest 1 View [[id number]] Collected: 01/17/22 0123      Updated: 01/17/22 1717    Narrative:      EXAM:    XR Chest, 1 View     EXAM DATE:    1/17/2022 4:12 PM     CLINICAL HISTORY:    DKA     TECHNIQUE:    Frontal view of the chest.     COMPARISON:    01/08/2022     FINDINGS:    Lungs:  Unremarkable.  No consolidation.    Pleural space:  Unremarkable.  No pneumothorax.    Heart:  Unremarkable.  No cardiomegaly.    Mediastinum:  Unremarkable.    Bones/joints:  Unremarkable.       Impression:        Unremarkable exam. No acute cardiopulmonary findings identified.     This report was finalized on 1/17/2022 5:04 PM by Dr. Inocencio Smith MD.           Final impressions for the last 30 days of radiology reports:    CT Abdomen Pelvis With Contrast    Result Date: 1/9/2022  1. Mild thickening of the urinary bladder wall may be due to incomplete distention or cystitis. 2. No definite acute findings in the GI tract. The appendix is normal. 3. Small uterine fibroid. 4. Nonobstructed kidneys. Signer Name: Jean Claude Tristan MD  Signed: 1/9/2022 2:48 AM  Workstation Name: [facility]  [facility]    XR Chest 1 View    Result Date: 1/17/2022    Unremarkable exam. No acute cardiopulmonary findings identified.  This report was finalized on 1/17/2022 5:04 PM by Dr. Inocencio Smith MD.      XR Chest 1 View    Result Date: 1/8/2022  No acute cardiopulmonary findings. Signer Name: Jean Claude Falk MD  Signed: 1/8/2022 9:40 PM  Workstation Name: [id number]  [facility]    CT Chest Pulmonary Embolism    Result Date: 1/9/2022  1. No PE or aortic dissection. 2. Negative for pneumonia. Signer Name: Jean Claude Tristan MD  Signed: 1/9/2022 2:44 AM  Workstation Name: [facility]  [facility]    I have personally looked at the radiology images and read the final radiology report.    Assessment & Plan    36F PMH IDDM Type I, recently admitted this facility 1/10-11 for Sepsis, UTI, DKA, presents w/ nausea and vomiting, found to be again  in DKA but also this time testing positive for Covid 19.     #DKA in setting of IDDM Type II, uncontrolled, unknown complications, recent Hx DKA  #Lactic Acidosis - Resolved  - Hgb A1c = 9.0%  - Patient presented w/ nausea, vomiting, increased glucose, gap open, underlying viral infection, recent Hx DKA this month.  - Admission labs showed Glc 555, AG 34, Bicarb 5.5, Acetone large, Bcx's pending  - CXR w/o acute cardiopulmonary findings.  - Will trend Glc q1hr initially, trend BMP q4hrs, trend Mg and Phos as well  - Will replace isotonic fluids and correct electrolytes per protocol  - Given gap was closed and already eating will attempt to transition to SQ insulin but if needed will restart insulin gtt.  - Will admit to CCU as PCU overflow, monitor on telemetry, trend labs and treat per protocol.    #Covid 19, Asymptomatic  - Have ordered TTE for cardiac function baseline  - Have ordered b/l Venous Duplex to rule out DVT  - Does not meet criteria for Remdesivir or Steroids at this time  - Continue Level I AC for Covid 19 thromboembolism PPx  - APAP PRN for fevers if needed  - Trend Covid 19 progression labs w/ CBC, CMP, D-dimer daily   - Continue enhanced droplet/contact isolation precautions, monitor for need to initiate 02    #Non-Oliguric TAMAR 2/2 DKA - Resolved  - B/l Cr NML, was up to 1.4 on admission; Now 0.8  - Continue mIVFs per protocol, Trend Cr and UOP, avoid nephrotoxins, NSAIDs, dehydration and contrast as able.    #Electrolyte Abnormalities  - Borderline Hypomagnesemia - Mg 1.7, Replace per protocol  - Pseudohyponatremia - Na 129 on admission, improving w/ treatment DKA, now 132  - Acute Mild Hypophosphatemia - Phos 1.1, Replace per protocol    F: Oral  E: Monitor & Replace PRN  N: DM diet  Ppx: Plov (Level I AC)  Code: Full Code     Dispo: Pending workup and clinical improvement     *This patient is considered high risk due to DKA, Covid 19, lactic acidosis, TAMAR.    VTE Prophylaxis:   Mechanical Order  History:     None      Pharmalogical Order History:      Ordered     Dose Route Frequency Stop    Signed and Held  enoxaparin (LOVENOX) syringe 40 mg         40 mg SC Every 24 Hours --              Santiago Jones MD  [facility]  01/18/22  17:08 EST      Electronically signed by Santiago Jones MD at 01/18/22 1711          Emergency Department Notes      Scotty Nina RN at 01/17/22 1604        Blood glucose 544mg/dl provider made aware     Scotty Nina RN  01/17/22 1604      Electronically signed by Scotty Nina RN at 01/17/22 1604     Charles Everett PA at 01/17/22 1615     Attestation signed by North Hannah MD at 01/18/22 1925          For this patient encounter, I reviewed the NP or PA documentation, treatment plan, and medical decision making. North Hannah MD 1/18/2022 19:25 EST                  Subjective   36-year-old white female presents secondary to weakness along with nausea vomiting and elevated glucose.  Patient recently had DKA.  She got out of the hospital on Tuesday.  She states she started having nausea vomiting and weakness on Saturday.  This is progressively worsened.  She denies any chest pain pressure tightness or squeezing.  She states she feels short of breath.  She denies any exposure to COVID or COVID symptoms otherwise.  Her last menstrual cycle was last Saturday.  She voices no other complaints this time.          Review of Systems   Constitutional: Negative.  Negative for fever.   HENT: Negative.    Respiratory: Negative.    Cardiovascular: Negative.  Negative for chest pain.   Gastrointestinal: Negative.  Negative for abdominal pain.   Endocrine: Negative.    Genitourinary: Negative.  Negative for dysuria.   Skin: Negative.    Neurological: Negative.    Psychiatric/Behavioral: Negative.    All other systems reviewed and are negative.      Past Medical History:   Diagnosis Date   • Diabetes (HCC)        No Known Allergies    Past Surgical  History:   Procedure Laterality Date   • TONSILLECTOMY         Family History   Problem Relation Age of Onset   • Diabetes Mother    • Heart disease Paternal Grandmother        Social History     Socioeconomic History   • Marital status: Single   Tobacco Use   • Smoking status: Never Smoker   • Smokeless tobacco: Never Used   Substance and Sexual Activity   • Alcohol use: Not Currently           Objective   Physical Exam  Vitals and nursing note reviewed.   Constitutional:       General: She is not in acute distress.     Appearance: She is well-developed. She is not diaphoretic.      Comments: Kussmaul breathing, pale.  Appears ill.   HENT:      Head: Normocephalic and atraumatic.      Right Ear: External ear normal.      Left Ear: External ear normal.      Nose: Nose normal.   Eyes:      Conjunctiva/sclera: Conjunctivae normal.      Pupils: Pupils are equal, round, and reactive to light.   Neck:      Vascular: No JVD.      Trachea: No tracheal deviation.   Cardiovascular:      Rate and Rhythm: Normal rate and regular rhythm.      Heart sounds: Normal heart sounds. No murmur heard.      Pulmonary:      Effort: Pulmonary effort is normal. No respiratory distress.      Breath sounds: Normal breath sounds. No wheezing.   Abdominal:      General: Bowel sounds are normal.      Palpations: Abdomen is soft.      Tenderness: There is no abdominal tenderness.   Musculoskeletal:         General: No deformity. Normal range of motion.      Cervical back: Normal range of motion and neck supple.   Skin:     General: Skin is warm and dry.      Coloration: Skin is not pale.      Findings: No erythema or rash.   Neurological:      Mental Status: She is alert and oriented to person, place, and time.      Cranial Nerves: No cranial nerve deficit.   Psychiatric:         Behavior: Behavior normal.         Thought Content: Thought content normal.         Procedures          ED Course  ED Course as of 01/18/22 1633   Mon Jan 17, 2022    1654 Unfortunately due to winter weather along with pandemic there are no options of transfer.  There are also no beds in this facility.  We will treat patient here in the ER until a bed becomes available. [JI]   1859 ECG 12 Lead  Sinus tachycardia.  Rate 134.  Normal axis.  Normal QT interval.  Right atrial enlargement.  Baseline artifact with no definite acute ischemic changes.  Abnormal EKG.  Interpreted by me. [BC]   2000 Signed out to Dr Brian [JI]   Tue Jan 18, 2022   0607 XR Chest 1 View  IMPRESSION:    Unremarkable exam. No acute cardiopulmonary findings identified. [ES]   0848 Resumed care at shift change.  Patient resting comfortably.  Labs are improving.  Disc obtained patient's repeat BMP and her gap is closed.  We will stop her insulin drip. [JI]   1138 Patient was accepted in [place].  To Dr. Woody [JI]   1256 Called a preliminary blood culture, patient has staph not aureus.  We will continue to follow. [JI]   1557 Patient was accepted by Dr. Jones [JI]      ED Course User Index  [BC] North Hannah MD  [ES] Jaya Brian MD  [JI] Charles Everett PA                                                 MDM  Number of Diagnoses or Management Options  COVID-19: new and requires workup  Diabetic ketoacidosis without coma associated with type 1 diabetes mellitus (HCC): new and requires workup  Hypophosphatemia: new and requires workup     Amount and/or Complexity of Data Reviewed  Clinical lab tests: reviewed and ordered  Tests in the radiology section of CPT®: reviewed and ordered  Tests in the medicine section of CPT®: reviewed and ordered  Independent visualization of images, tracings, or specimens: yes        Final diagnoses:   Diabetic ketoacidosis without coma associated with type 1 diabetes mellitus (HCC)   Hypophosphatemia   COVID-19       ED Disposition  ED Disposition     ED Disposition Condition Comment    Decision to Admit  Level of Care: Progressive Care [20]   Diagnosis: DKA  (diabetic ketoacidosis) ([facility]) [[id number]]   Admitting Physician: RIGO LOWE [[id number]]   Attending Physician: RIGO LOWE [[id number]]   Certification: I Certify That Inpatient Hospital Services Are Medically Necessary For Greater Than 2 Midnights            No follow-up provider specified.       Medication List      No changes were made to your prescriptions during this visit.          Charles Everett PA  01/18/22 1633      Electronically signed by North Hannah MD at 01/18/22 1925     Prema Gordon, RN at 01/17/22 1842        POC glucose 439 mg/dL     Prema Gordon, PARADISE  01/17/22 1843      Electronically signed by Prema Gordon, RN at 01/17/22 1843     Michael Cadet PCT at 01/18/22 0759        PT glucose 136 PARADISE Navas notified     Michael Cadet PCT  01/18/22 0804      Electronically signed by Michael Cadet PCT at 01/18/22 0804     Michael Cadet PCT at 01/18/22 0903        Pt. Glucose 167 PARADISE Navas notified     Michael Cadet PCT  01/18/22 0903       Michael Cadet PCT  01/18/22 0956      Electronically signed by Michael Cadet PCT at 01/18/22 0956     Michael Cadet PCT at 01/18/22 0956        Pt glucose 188 RN Eloise notified     Michael Cadet PCT  01/18/22 0957       Cadet, Michael, PCT  01/18/22 1300      Electronically signed by Michael Cadet PCT at 01/18/22 1300     Michael Cadet PCT at 01/18/22 1211        Pt glucose 244 RN Eloise notified     Michael Cadet PCT  01/18/22 1212      Electronically signed by Michael Cadet PCT at 01/18/22 1212     Michael Cadet PCT at 01/18/22 1259        Pt glucose 209 RN Eloise notifMichael Christiansen PCT  01/18/22 1301      Electronically signed by Michael Cadet PCT at 01/18/22 1301     Nataliya Quan at 01/18/22 1425        Called to check on status of pt bed.  They had a pt escalate in their er and they no longer have an immediate bed for pt.      Nataliya Quan  01/18/22 1426      Electronically signed by Nataliya Quan at 01/18/22 1426     Michael Cadet, PCT at  01/18/22 1431        Pt glucose 216 RN Eloise notified     Michael Cadet, PCT  01/18/22 1431      Electronically signed by Michael Cadet, PCT at 01/18/22 1431     Michael Cadet, PCT at 01/18/22 1511        Pt glucose 252 RN Eloise notified     Michael Cadet, PCT  01/18/22 1511      Electronically signed by Michael Cadet, PCT at 01/18/22 1511     Michael Cadet, PCT at 01/18/22 1617        Pt glucose 264 RN Eloise notified      Michael Cadet, PCT  01/18/22 1618      Electronically signed by Michael Cadet, PCT at 01/18/22 1618     Michael Cadet, PCT at 01/18/22 1719        Pt glucose 247 RN Eloise notified     Michael Cadet, PCT  01/18/22 1719      Electronically signed by Michael Cadet, PCT at 01/18/22 1719       Vital Signs (last 2 days)     Date/Time Temp Temp src Pulse Resp BP Patient Position SpO2    01/19/22 1000 -- -- 97 17 126/74 Lying 98    01/19/22 0900 -- -- 96 34 127/87 Lying 99    01/19/22 0800 97.5 (36.4) Oral 102 17 129/88 Lying 98    01/19/22 0700 -- -- 92 20 118/77 Lying 100    01/19/22 0639 100.2 (37.9) Oral -- -- -- -- --    01/19/22 0603 -- -- 86 -- 121/82 -- 100    01/19/22 0503 -- -- 97 -- 124/80 -- 100    01/19/22 0403 -- -- 93 -- 121/74 -- 100    01/19/22 0303 -- -- 96 -- 118/73 -- 100    01/19/22 0222 -- -- 91 -- 113/77 -- 100    01/19/22 0117 -- -- -- -- 127/78 -- 100    01/19/22 0116 -- -- 98 -- -- -- 100    01/18/22 2218 -- -- 104 -- 114/76 -- 99    01/18/22 2109 -- -- 101 -- -- -- --    01/18/22 2102 -- -- -- -- 116/83 -- 99    01/18/22 2016 -- -- 98 -- 122/75 -- 100    01/18/22 1950 -- -- -- -- 116/84 -- 99    01/18/22 1937 -- -- 110 -- -- -- --    01/18/22 1918 97.9 (36.6) Oral 126 18 116/84 -- 99    01/18/22 17:59:54 97.2 (36.2) Infrared 92 19 102/62 -- 100    01/18/22 1000 97.4 (36.3) Infrared 100 20 110/70 -- 100    01/18/22 0730 97.6 (36.4) Infrared 101 20 98/69 -- 99    01/18/22 0317 -- -- 107 -- 133/85 -- 100    01/18/22 0302 -- -- 115 -- 139/84 -- 100    01/18/22 0247 -- -- 114 -- 128/82 --  100    01/18/22 0233 -- -- 118 -- 133/82 -- 100    01/18/22 0217 -- -- 121 -- 132/94 -- 100    01/18/22 0203 -- -- 125 -- 149/75 -- 100    01/18/22 0147 -- -- 110 -- 126/76 -- 100    01/18/22 0132 -- -- 123 -- 131/86 -- 100    01/18/22 0117 -- -- 113 -- 136/90 -- 100    01/17/22 2302 -- -- 106 -- 124/81 -- 100    01/17/22 2247 -- -- 106 -- 141/92 -- 100    01/17/22 2232 -- -- 121 -- 123/80 -- 100    01/17/22 2217 -- -- 114 -- 118/76 -- 97    01/17/22 2202 -- -- 118 -- 118/81 -- 99    01/17/22 2147 -- -- 129 -- 118/83 -- 100    01/17/22 2134 -- -- 128 -- 120/76 -- 99    01/17/22 2117 -- -- 124 -- 110/81 -- 98    01/17/22 2102 -- -- 135 -- 110/87 -- 100    01/17/22 2047 -- -- 142 -- 131/87 -- 99    01/17/22 2032 -- -- 133 -- 122/91 -- 100    01/17/22 2017 -- -- 138 -- 120/93 -- 99    01/17/22 2002 -- -- 140 -- 130/97 -- 99    01/17/22 1947 -- -- 140 -- 111/98 -- 98    01/17/22 1918 -- -- -- -- 122/96  -- 100     01/17/22 1846 -- -- 138  -- 116/98  -- 100     01/17/22 1834 -- -- 137  -- 142/94  -- 100     01/17/22 17:50:15 -- -- -- -- 109/59 -- --    01/17/22 1749 -- -- 142 -- -- -- 99    01/17/22 1658 -- -- 131  -- 132/93  -- 100     01/17/22 1556 97.7 (36.5) Oral 154 20 162/99 Lying 100    Comments:   BP: provider aware at 01/17/22 1918   SpO2: provider aware at 01/17/22 1918   Pulse: provider aware at 01/17/22 1846   BP: provider aware at 01/17/22 1846   SpO2: provider aware at 01/17/22 1846   Pulse: provider aware at 01/17/22 1834   BP: provider aware at 01/17/22 1834   SpO2: provider aware at 01/17/22 1834   Pulse: provider aware at 01/17/22 1658   BP: provider aware at 01/17/22 1658   SpO2: provider aware at 01/17/22 1658       Intake & Output (last 2 days)       01/17 0701  01/18 0700 01/18 0701  01/19 0700 01/19 0701  01/20 0700    P.O.   250    I.V. (mL/kg) 26 (0.3)      IV Piggyback 1000 500     Total Intake(mL/kg) 1026 (13.3) 500 (7.8) 250 (3.9)    Net +1026 +500 +250           Urine Unmeasured Occurrence  1  x 1 x        Lines, Drains & Airways     Active LDAs     Name Placement date Placement time Site Days    Peripheral IV 01/17/22 1603 Left Antecubital 01/17/22  1603  Antecubital  1    Peripheral IV 01/17/22 1630 Right Antecubital 01/17/22  1630  Antecubital  1          Inactive LDAs     None                  Medication Administration Report for Marta Hahn as of 01/19/22 1132   Legend:    Given Hold Not Given Due Canceled Entry Other Actions    Time Time (Time) Time  Time-Action       Discontinued     Completed     Future     MAR Hold     Linked           Medications 01/17/22 01/18/22 01/19/22    atorvastatin (LIPITOR) tablet 10 mg  Dose: 10 mg  Freq: Daily Route: PO  Start: 01/18/22 1830   Admin Instructions:   Avoid grapefruit juice.         (0326)-Not Given     0825-Given            enoxaparin (LOVENOX) syringe 40 mg  Dose: 40 mg  Freq: Every 24 Hours Route: SC  Indications of Use: PROPHYLAXIS OF VENOUS THROMBOEMBOLISM  Start: 01/18/22 1800   Admin Instructions:   Give subcutaneous in abdomen only. Do not massage site after injection.         (0326)-Not Given     1812            insulin aspart (novoLOG) injection 0-9 Units  Dose: 0-9 Units  Freq: 3 Times Daily Before Meals Route: SC  Start: 01/18/22 1130   Admin Instructions:   Correction - Moderate Dose.  40-60 units/day total insulin dose or average weight, on oral agents    Blood glucose 150-199 mg/dL - 2 units  Blood glucose 200-249 mg/dL - 4 units  Blood glucose 250-299 mg/dL - 6 units  Blood glucose 300-349 mg/dL - 7 units  Blood glucose 350-400 mg/dL - 8 units  Blood glucose greater than 400 mg/dL - 9 units and call provider          1155-Given     1726-Given            0822-Given     1130     1730           potassium phosphate 45 mmol in sodium chloride 0.9 % 500 mL infusion  Dose: 45 mmol  Freq: Once Route: IV  Start: 01/19/22 1200   Admin Instructions:   Phos level = 1.2  Replace per protocol    Recheck Phosphorus level 6 hours after replacement  complete.  Refrigerate.      Doses listed as mmol of phosphate.   4.4 mEq of Potassium = 3 mmol of Phosphate         1200             sodium chloride 0.9 % flush 10 mL  Dose: 10 mL  Freq: Every 12 Hours Scheduled Route: IV  Start: 01/18/22 2100         0022-Given     0823-Given     2100           sodium chloride 0.9 % flush 3 mL  Dose: 3 mL  Freq: Every 12 Hours Scheduled Route: IV  Start: 01/17/22 2100       2100             (1011)-Not Given             0022-Given     0825-Given     2100          Completed Medications  Medications 01/17/22 01/18/22 01/19/22       dextrose (D50W) (25 g/50 mL) IV injection 25 g  Dose: 25 g  Freq: Once Route: IV  Start: 01/18/22 0709   End: 01/18/22 0711        0711-Given              dextrose (D50W) (25 g/50 mL) IV injection 25 g  Dose: 25 g  Freq: Once Route: IV  Start: 01/18/22 0208   End: 01/18/22 0324        0324-Given              insulin detemir (LEVEMIR) injection 10 Units  Dose: 10 Units  Freq: Once Route: SC  Start: 01/18/22 1500   End: 01/18/22 1507   Admin Instructions:           1507-Given              iopamidol (ISOVUE-370) 76 % injection 100 mL  Dose: 100 mL  Freq: Once in Imaging Route: IV  Start: 01/18/22 2300   End: 01/18/22 2208        2208-Given              ondansetron (ZOFRAN) injection 4 mg  Dose: 4 mg  Freq: Once Route: IV  Start: 01/18/22 1054   End: 01/18/22 1102        1102-Given              prochlorperazine (COMPAZINE) injection 5 mg  Dose: 5 mg  Freq: Once Route: IV  Start: 01/17/22 1737   End: 01/17/22 1840       1840-Given               prochlorperazine (COMPAZINE) injection 5 mg  Dose: 5 mg  Freq: Once Route: IV  Start: 01/17/22 1612   End: 01/17/22 1627       1627-Given               sodium chloride 0.9 % bolus 2,000 mL  Dose: 2,000 mL  Freq: Once Route: IV  Start: 01/17/22 1608   End: 01/18/22 0700       1611-New Bag             0700-Stopped              sodium phosphates 45 mmol in sodium chloride 0.9 % 500 mL IVPB  Dose: 45 mmol  Freq: Once Route:  IV  Start: 01/18/22 0900   End: 01/18/22 1602   Admin Instructions:   Recheck Phosphorus level 6 hours after replacement complete.        0930-New Bag     1602-Stopped     1928-Restarted                  and   Medication Administration Report for Marta Hahn as of 01/19/22 1132   Legend:    Given Hold Not Given Due Canceled Entry Other Actions    Time Time (Time) Time  Time-Action       Discontinued     Completed     Future     MAR Hold     Linked           Medications 01/17/22 01/18/22 01/19/22    dextrose 5 % and sodium chloride 0.45 % 1,000 mL with potassium chloride 40 mEq infusion  Rate: 75 mL/hr Freq: Continuous Route: IV  Start: 01/17/22 2043        0349-Currently Infusing     0851-Stopped     1604-Stopped            dextrose 5 % and sodium chloride 0.45 % infusion  Rate: 150 mL/hr Dose: 150 mL/hr  Freq: Continuous PRN Route: IV  PRN Comment: For Corrected Sodium >/= 135 and K > 5.5 and Glucose </= 200 for DKA  or Glucose </= 300 for HHS  Start: 01/17/22 1605        1605-New Bag             0222-Stopped             dextrose 5 % and sodium chloride 0.45 % with KCl 20 mEq/L infusion  Rate: 150 mL/hr Dose: 150 mL/hr  Freq: Continuous PRN Route: IV  PRN Comment: For Corrected Sodium >/=135 and K 3.3-5.5 and Glucose </= 200 for DKA or Glucose </= 300 for HHS  Start: 01/17/22 1605          dextrose 5 % and sodium chloride 0.45 % with KCl 40 mEq/L infusion  Rate: 150 mL/hr Dose: 150 mL/hr  Freq: Continuous PRN Route: IV  PRN Comment: For Corrected Sodium >/=135 and K <3.3 and Glucose </= 200 for DKA or Glucose </= 300 for HHS  Start: 01/17/22 1605       2221-New Bag             1512-New Bag     1605-Stopped             dextrose 5 % and sodium chloride 0.9 % infusion  Rate: 150 mL/hr Dose: 150 mL/hr  Freq: Continuous PRN Route: IV  PRN Comment: For Corrected Sodium <135 and K > 5.5 and Glucose </= 200 for DKA or Glucose </= 300 for HHS  Start: 01/17/22 1605          dextrose 5 % and sodium chloride 0.9 % with KCl  20 mEq/L infusion  Rate: 150 mL/hr Dose: 150 mL/hr  Freq: Continuous PRN Route: IV  PRN Comment: For Corrected Sodium <135 and K 3.3 - 5.5  and Glucose </= 200 for DKA or Glucose </= 300 for HHS  Start: 01/17/22 1605       (2139)-Not Given               dextrose 5 % and sodium chloride 0.9 % with KCl 40 mEq/L infusion  Rate: 150 mL/hr Dose: 150 mL/hr  Freq: Continuous PRN Route: IV  PRN Comment: For Corrected Sodium <135 and K <3.3 and Glucose </= 200 for DKA or Glucose </= 300 for HHS  Start: 01/17/22 1605          insulin regular 1 unit/mL in 0.9% sodium chloride  Rate: 7 mL/hr Dose: 7 Units/hr  Freq: Titrated Route: IV  Start: 01/17/22 1608   Admin Instructions:   Prior to starting Intravenous Insulin Infusion: Notify provider if K < 3.3 for extra potassium orders, if indicated. Do not start insulin drip if K < 3.3.  FIRST STEP: Fixed Rate Intravenous Insulin Infusion. Refer to table in protocol to verify rate for insulin is correct for patient weight. If not, adjust accordingly.  If BG is not falling by at least 50 mg/dL per hour, then increase insulin 1 unit/hr to a maximum of 15 units/hr.      SECOND STEP: Variable Rate Intravenous Insulin Infusion. When BG is less than or equal to 200 (for DKA) or less than or equal to 300 (for HHS), DECREASE INTRAVENOUS INSULIN INFUSION DOSE BY HALF and adjust rate as follows:  BG < 100: Decrease dose by 1 unit/hr and give 25mL D50W intravenously.  -160: Decrease dose by 1 unit/hr  -220: No change in dose  -280: Increase dose by 1 unit/hr  BG > 280: Increase dose by 2 units/hr.    Once DKA or HHS resolves, call provider for transition orders from intravenous to SQ insulin. DO NOT DISCONTINUE INSULIN INFUSION FOR 1 HOUR AFTER FIRST DOSE OF LONG-ACTING SQ DOSE.  Prime the line with the drip then waste 20 mL prior to beginning infusion       1629-Hold [C]     1652-New Bag     2035-Stopped [C]       2139-Restarted             0708-Rate Change (DUAL SIGN)  [C]     0851-Stopped             sodium chloride 0.45 % 1,000 mL with potassium chloride 40 mEq infusion  Rate: 250 mL/hr Dose: 250 mL/hr  Freq: Continuous PRN Route: IV  PRN Comment: For Corrected Sodium >/=135 and K <3.3 and Glucose >200 for DKA or Glucose >300 for HHS  Start: 01/17/22 1605          sodium chloride 0.45 % infusion  Rate: 250 mL/hr Dose: 250 mL/hr  Freq: Continuous PRN Route: IV  PRN Comment: For Corrected Sodium >/= 135 and K >5.5 and Glucose >200 for DKA or Glucose >300 for HHS  Start: 01/17/22 1605          sodium chloride 0.45 % with KCl 20 mEq/L infusion  Rate: 250 mL/hr Dose: 250 mL/hr  Freq: Continuous PRN Route: IV  PRN Comment: For Corrected Sodium >/= 135 and K 3.3-5.5 and Glucose >200 for DKA or Glucose >300 for HHS  Start: 01/17/22 1605          sodium chloride 0.9 % infusion  Rate: 75 mL/hr Dose: 75 mL/hr  Freq: Continuous Route: IV  Start: 01/19/22 0240          sodium chloride 0.9 % infusion  Rate: 10 mL/hr Dose: 10 mL/hr  Freq: Continuous PRN Route: IV  PRN Comment: if insulin infusion rate is insufficient to maintain IV patency.  Start: 01/17/22 1605          sodium chloride 0.9 % infusion  Rate: 250 mL/hr Dose: 250 mL/hr  Freq: Continuous PRN Route: IV  PRN Comment: For Corrected Sodium < 135 and K>5.5 and Glucose >200 for DKA or Glucose >300 for HHS  Start: 01/17/22 1605          sodium chloride 0.9 % with KCl 20 mEq/L infusion  Rate: 250 mL/hr Dose: 250 mL/hr  Freq: Continuous PRN Route: IV  PRN Comment: For Corrected Sodium <135 and K 3.3-5.5 and Glucose >200 for DKA or Glucose >300 for HHS  Start: 01/17/22 1605          sodium chloride 0.9 % with KCl 40 mEq/L infusion  Rate: 250 mL/hr Dose: 250 mL/hr  Freq: Continuous PRN Route: IV  PRN Comment: For Corrected Sodium <135 and K <3.3 and Glucose >200 for DKA or Glucose >300 for HHS  Start: 01/17/22 1605         Discontinued Medications  Medications 01/17/22 01/18/22 01/19/22       dextrose 5 % and sodium chloride 0.45 % with KCl  20 mEq/L infusion  Rate: 150 mL/hr Dose: 150 mL/hr  Freq: Continuous Route: IV  Start: 01/17/22 2149   End: 01/17/22 2149 2149               Pharmacy to Dose enoxaparin (LOVENOX)  Freq: Continuous PRN Route: XX  PRN Reason: Consult  Indications of Use: PROPHYLAXIS OF VENOUS THROMBOEMBOLISM  Indications Comment: covid positive, elevated d-dimer but <3, [place] guidelines risk level 1  Start: 01/18/22 2121   End: 01/18/22 2125                 Lab Results (all)     Procedure Component Value Units    Blood Culture - Blood, Hand, Right [[id number]]    Specimen: Blood from Hand, Right    Blood Culture - Blood, Hand, Left [[id number]]    Specimen: Blood from Hand, Left    Phosphorus [[id number]]  (Abnormal)    Specimen: Blood     Phosphorus 1.2 mg/dL    Basic Metabolic Panel [[id number]]  (Abnormal)    Specimen: Blood     Glucose 259 mg/dL     BUN 6 mg/dL     Creatinine 0.66 mg/dL     Sodium 133 mmol/L     Potassium 3.5 mmol/L     Chloride 100 mmol/L     CO2 17.4 mmol/L     Calcium 8.3 mg/dL     eGFR Non African Amer 102 mL/min/1.73     BUN/Creatinine Ratio     Anion Gap 15.6 mmol/L          Magnesium [[id number]]  (Normal)    Specimen: Blood     Magnesium 1.9 mg/dL    Blood Culture - Blood, Arm, Right [[id number]]  (Abnormal)    Specimen: Blood from Arm, Right     Blood Culture     Isolated from     Gram Stain          POC Glucose Once [[id number]]  (Abnormal)    Specimen: Blood     Glucose 180 mg/dL        POC Glucose Once [[id number]]  (Abnormal)    Specimen: Blood     Glucose 176 mg/dL        Ketone Bodies, Serum (Not performed at [place]) [[id number]]  (Normal)    Specimen: Blood          Acetone [[id number]]  (Normal)    Specimen: Blood     Acetone    Phosphorus [[id number]]  (Abnormal)    Specimen: Blood     Phosphorus 1.6 mg/dL    Basic Metabolic Panel [[id number]]  (Abnormal)    Specimen: Blood     Glucose 187 mg/dL     BUN 6 mg/dL     Creatinine 0.61 mg/dL     Sodium 133 mmol/L     Potassium 3.2 mmol/L     Chloride 102  mmol/L     CO2 19.4 mmol/L     Calcium 7.9 mg/dL     eGFR Non African Amer 112 mL/min/1.73     BUN/Creatinine Ratio     Anion Gap 11.6 mmol/L          Magnesium [[id number]]  (Normal)    Specimen: Blood     Magnesium 1.8 mg/dL    POC Glucose Once [[id number]]  (Abnormal)    Specimen: Blood     Glucose 174 mg/dL        POC Glucose Once [[id number]]  (Abnormal)    Specimen: Blood     Glucose 173 mg/dL        POC Glucose Once [[id number]]  (Abnormal)    Specimen: Blood     Glucose 200 mg/dL        D-dimer, Quantitative [[id number]]  (Abnormal)    Specimen: Blood     D-Dimer, Quantitative 1.88 MCGFEU/mL          Comprehensive Metabolic Panel [[id number]]  (Abnormal)    Specimen: Blood     Glucose 161 mg/dL     BUN 7 mg/dL     Creatinine 0.70 mg/dL     Sodium 134 mmol/L     Potassium 3.3 mmol/L     Chloride 101 mmol/L     CO2 19.0 mmol/L     Calcium 8.1 mg/dL     Total Protein 6.1 g/dL     Albumin 3.56 g/dL     ALT (SGPT) 43 U/L     AST (SGOT) 56 U/L     Alkaline Phosphatase 136 U/L     Total Bilirubin 0.4 mg/dL     eGFR Non African Amer 95 mL/min/1.73     Globulin 2.5 gm/dL     A/G Ratio 1.4 g/dL     BUN/Creatinine Ratio     Anion Gap 14.0 mmol/L          POC Glucose Once [[id number]]  (Abnormal)    Specimen: Blood     Glucose 163 mg/dL        Phosphorus [[id number]]  (Abnormal)    Specimen: Blood     Phosphorus 2.2 mg/dL    Magnesium [[id number]]  (Normal)    Specimen: Blood     Magnesium 1.7 mg/dL    Ketone Bodies, Serum (Not performed at [place]) [[id number]]  (Abnormal)    Specimen: Blood          Acetone [[id number]]  (Abnormal)    Specimen: Blood     Acetone    CBC & Differential [[id number]]  (Abnormal)    Specimen: Blood          CBC Auto Differential [[id number]]  (Abnormal)    Specimen: Blood     WBC 4.80 10*3/mm3     RBC 3.81 10*6/mm3     Hemoglobin 12.2 g/dL     Hematocrit 35.8 %     MCV 94.0 fL     MCH 32.0 pg     MCHC 34.1 g/dL     RDW 14.1 %     RDW-SD 48.9 fl     MPV 9.3 fL     Platelets 263 10*3/mm3     Neutrophil  % 66.9 %     Lymphocyte % 15.6 %     Monocyte % 16.9 %     Eosinophil % 0.0 %     Basophil % 0.2 %     Immature Grans % 0.4 %     Neutrophils, Absolute 3.21 10*3/mm3     Lymphocytes, Absolute 0.75 10*3/mm3     Monocytes, Absolute 0.81 10*3/mm3     Eosinophils, Absolute 0.00 10*3/mm3     Basophils, Absolute 0.01 10*3/mm3     Immature Grans, Absolute 0.02 10*3/mm3     nRBC 0.0 /100 WBC    POC Glucose Once [[id number]]  (Abnormal)    Specimen: Blood     Glucose 149 mg/dL        POC Glucose Once [[id number]]  (Abnormal)    Specimen: Blood     Glucose 133 mg/dL        POC Glucose Once [[id number]]  (Normal)    Specimen: Blood     Glucose 130 mg/dL        POC Glucose Once [[id number]]  (Normal)    Specimen: Blood     Glucose 122 mg/dL        D-dimer, Quantitative [[id number]]  (Abnormal)    Specimen: Blood     D-Dimer, Quantitative 1.98 MCGFEU/mL          Phosphorus [[id number]]  (Abnormal)    Specimen: Blood     Phosphorus 1.8 mg/dL    Basic Metabolic Panel [[id number]]  (Abnormal)    Specimen: Blood     Glucose 115 mg/dL     BUN 10 mg/dL     Creatinine 0.91 mg/dL     Sodium 136 mmol/L     Potassium 3.1 mmol/L     Chloride 101 mmol/L     CO2 16.8 mmol/L     Calcium 8.8 mg/dL     eGFR Non African Amer 70 mL/min/1.73     BUN/Creatinine Ratio     Anion Gap 18.2 mmol/L          Magnesium [[id number]]  (Normal)    Specimen: Blood     Magnesium 1.6 mg/dL    Ketone Bodies, Serum (Not performed at [place]) [[id number]]  (Normal)    Specimen: Blood          Acetone [[id number]]  (Normal)    Specimen: Blood     Acetone    POC Glucose Once [[id number]]  (Abnormal)    Specimen: Blood     Glucose 141 mg/dL        POC Glucose Once [[id number]]  (Abnormal)    Specimen: Blood     Glucose 247 mg/dL        Blood Culture - Blood, Hand, Right [[id number]]  (Normal)    Specimen: Blood from Hand, Right     Blood Culture    Ketone Bodies, Serum (Not performed at [place]) [[id number]]  (Abnormal)    Specimen: Blood from Arm, Right          Acetone  [[id number]]  (Abnormal)    Specimen: Blood from Arm, Right     Acetone    Phosphorus [[id number]]  (Abnormal)    Specimen: Blood from Arm, Right     Phosphorus 2.3 mg/dL    Basic Metabolic Panel [[id number]]  (Abnormal)    Specimen: Blood from Arm, Right     Glucose 277 mg/dL     BUN 11 mg/dL     Creatinine 0.83 mg/dL     Sodium 133 mmol/L     Potassium 3.7 mmol/L     Chloride 101 mmol/L     CO2 15.6 mmol/L     Calcium 8.4 mg/dL     eGFR Non African Amer 78 mL/min/1.73     BUN/Creatinine Ratio     Anion Gap 16.4 mmol/L          Magnesium [[id number]]  (Normal)    Specimen: Blood from Arm, Right     Magnesium 1.8 mg/dL    POC Glucose Once [[id number]]  (Abnormal)    Specimen: Blood     Glucose 264 mg/dL        POC Glucose Once [[id number]]  (Abnormal)    Specimen: Blood     Glucose 252 mg/dL        POC Glucose Once [[id number]]  (Abnormal)    Specimen: Blood     Glucose 216 mg/dL        POC Glucose Once [[id number]]  (Abnormal)    Specimen: Blood     Glucose 209 mg/dL        Phosphorus [[id number]]  (Abnormal)    Specimen: Blood     Phosphorus 1.8 mg/dL    Blood Culture ID, PCR - Blood, Arm, Right [[id number]]  (Abnormal)    Specimen: Blood from Arm, Right     BCID, PCR     BOTTLE TYPE    Ketone Bodies, Serum (Not performed at [place]) [[id number]]  (Abnormal)    Specimen: Blood          Acetone [[id number]]  (Abnormal)    Specimen: Blood     Acetone    Basic Metabolic Panel [[id number]]  (Abnormal)    Specimen: Blood     Glucose 229 mg/dL     BUN 10 mg/dL     Creatinine 0.81 mg/dL     Sodium 132 mmol/L     Potassium 3.9 mmol/L     Chloride 102 mmol/L     CO2 13.8 mmol/L     Calcium 8.4 mg/dL     eGFR Non African Amer 80 mL/min/1.73     BUN/Creatinine Ratio     Anion Gap 16.2 mmol/L          Magnesium [[id number]]  (Normal)    Specimen: Blood     Magnesium 1.8 mg/dL    POC Glucose Once [[id number]]  (Abnormal)    Specimen: Blood     Glucose 244 mg/dL        POC Glucose Once [[id number]]  (Abnormal)    Specimen: Blood     Glucose  201 mg/dL        POC Glucose Once [[id number]]  (Abnormal)    Specimen: Blood     Glucose 188 mg/dL        POC Glucose Once [[id number]]  (Abnormal)    Specimen: Blood     Glucose 167 mg/dL        Phosphorus [[id number]]  (Abnormal)    Specimen: Blood from Arm, Right     Phosphorus 1.1 mg/dL    Basic Metabolic Panel [[id number]]  (Abnormal)    Specimen: Blood from Arm, Right     Glucose 158 mg/dL     BUN 10 mg/dL     Creatinine 0.85 mg/dL     Sodium 132 mmol/L     Potassium 3.9 mmol/L         Chloride 103 mmol/L     CO2 15.6 mmol/L     Calcium 8.2 mg/dL     eGFR Non African Amer 76 mL/min/1.73     BUN/Creatinine Ratio     Anion Gap 13.4 mmol/L          Magnesium [[id number]]  (Normal)    Specimen: Blood from Arm, Right     Magnesium 1.7 mg/dL    Ketone Bodies, Serum (Not performed at [place]) [[id number]]  (Abnormal)    Specimen: Blood from Arm, Right          Acetone [[id number]]  (Abnormal)    Specimen: Blood from Arm, Right     Acetone    POC Glucose Once [[id number]]  (Abnormal)    Specimen: Blood     Glucose 136 mg/dL        Basic Metabolic Panel [[id number]]  (Abnormal)    Specimen: Blood from Hand, Right     Glucose 73 mg/dL     BUN 10 mg/dL     Creatinine 0.81 mg/dL     Sodium 132 mmol/L     Potassium 3.7 mmol/L     Chloride 101 mmol/L     CO2 15.5 mmol/L     Calcium 8.3 mg/dL     eGFR Non African Amer 80 mL/min/1.73     BUN/Creatinine Ratio     Anion Gap 15.5 mmol/L          Lactic Acid, Plasma [[id number]]  (Normal)    Specimen: Blood from Hand, Right     Lactate 1.1 mmol/L    CBC & Differential [[id number]]  (Abnormal)    Specimen: Blood from Hand, Right          CBC Auto Differential [[id number]]  (Abnormal)    Specimen: Blood from Hand, Right     WBC 7.26 10*3/mm3     RBC 4.27 10*6/mm3     Hemoglobin 13.6 g/dL     Hematocrit 40.2 %     MCV 94.1 fL     MCH 31.9 pg     MCHC 33.8 g/dL     RDW 14.0 %     RDW-SD 48.8 fl     MPV 8.9 fL     Platelets 306 10*3/mm3     Neutrophil % 61.9 %     Lymphocyte % 19.8 %      Monocyte % 17.9 %     Eosinophil % 0.0 %     Basophil % 0.1 %     Immature Grans % 0.3 %     Neutrophils, Absolute 4.49 10*3/mm3     Lymphocytes, Absolute 1.44 10*3/mm3     Monocytes, Absolute 1.30 10*3/mm3     Eosinophils, Absolute 0.00 10*3/mm3     Basophils, Absolute 0.01 10*3/mm3     Immature Grans, Absolute 0.02 10*3/mm3     nRBC 0.0 /100 WBC    POC Glucose Once [[id number]]  (Abnormal)    Specimen: Blood     Glucose 57 mg/dL        Blood Gas, Venous With Co-Ox [[id number]]  (Abnormal)    Specimen: Venous Blood     Site     pH, Venous 7.351 pH Units     pCO2, Venous 36.2 mm Hg         pO2, Venous 35.4 mm Hg     HCO3, Venous 20.0 mmol/L         Base Excess, Venous -5.0 mmol/L     O2 Saturation, Venous 72.8 %     Hemoglobin, Blood Gas 13.9 g/dL     CO2 Content 21.1 mmol/L     Temperature 37.0 C     Barometric Pressure for Blood Gas 730 mmHg     Modality     FIO2 21 %     Ventilator Mode     Collected by         Oxyhemoglobin Venous 72.3 %     Carboxyhemoglobin Venous 0.4 %         Methemoglobin Venous 0.3 %    POC Glucose Once [[id number]]  (Normal)    Specimen: Blood     Glucose 77 mg/dL        POC Glucose Once [[id number]]  (Normal)    Specimen: Blood     Glucose 116 mg/dL        Phosphorus [[id number]]  (Abnormal)    Specimen: Blood from Hand, Right     Phosphorus 1.2 mg/dL    Basic Metabolic Panel [[id number]]  (Abnormal)    Specimen: Blood from Hand, Right     Glucose 169 mg/dL     BUN 11 mg/dL     Creatinine 0.89 mg/dL     Sodium 132 mmol/L     Potassium 4.1 mmol/L     Chloride 101 mmol/L     CO2 14.0 mmol/L     Calcium 8.2 mg/dL     eGFR Non African Amer 72 mL/min/1.73     BUN/Creatinine Ratio     Anion Gap 17.0 mmol/L          Magnesium [[id number]]  (Normal)    Specimen: Blood from Hand, Right     Magnesium 1.7 mg/dL    Ketone Bodies, Serum (Not performed at [place]) [[id number]]  (Abnormal)    Specimen: Blood from Hand, Right          Acetone [[id number]]  (Abnormal)    Specimen: Blood from Hand, Right      Acetone    POC Glucose Once [[id number]]  (Abnormal)    Specimen: Blood     Glucose 171 mg/dL        POC Glucose Once [[id number]]  (Normal)    Specimen: Blood     Glucose 74 mg/dL        Osmolality, Serum [[id number]]  (Abnormal)    Specimen: Blood from Hand, Right     Osmolality 329 mOsm/kg    POC Glucose Once [[id number]]  (Normal)    Specimen: Blood     Glucose 89 mg/dL        POC Glucose Once [[id number]]  (Normal)    Specimen: Blood     Glucose 114 mg/dL        Ketone Bodies, Serum (Not performed at [place]) [[id number]]  (Abnormal)    Specimen: Blood from Hand, Left          Acetone [[id number]]  (Abnormal)    Specimen: Blood from Hand, Left     Acetone    POC Glucose Once [[id number]]  (Abnormal)    Specimen: Blood     Glucose 159 mg/dL        POC Glucose Once [[id number]]  (Abnormal)    Specimen: Blood     Glucose 212 mg/dL        POC Glucose Once [[id number]]  (Abnormal)    Specimen: Blood     Glucose 192 mg/dL        POC Glucose Once [[id number]]  (Abnormal)    Specimen: Blood     Glucose 214 mg/dL        POC Glucose Once [[id number]]  (Abnormal)    Specimen: Blood     Glucose 185 mg/dL        POC Glucose Once [[id number]]  (Abnormal)    Specimen: Blood     Glucose 350 mg/dL        Hemoglobin A1c [[id number]]  (Abnormal)    Specimen: Blood from Arm, Left     Hemoglobin A1C 9.00 %          POC Glucose Once [[id number]]  (Abnormal)    Specimen: Blood     Glucose 439 mg/dL        Troponin [[id number]]  (Normal)    Specimen: Blood from Hand, Right     Troponin T <0.010 ng/mL        Ketone Bodies, Serum (Not performed at [place]) [[id number]]  (Abnormal)    Specimen: Blood from Hand, Right          Acetone [[id number]]  (Abnormal)    Specimen: Blood from Hand, Right     Acetone    Comprehensive Metabolic Panel [[id number]]  (Abnormal)    Specimen: Blood from Hand, Right     Glucose 555 mg/dL     BUN 19 mg/dL     Creatinine 1.43 mg/dL     Sodium 129 mmol/L     Potassium 5.0 mmol/L         Chloride 89 mmol/L     CO2 5.5 mmol/L      Calcium 8.6 mg/dL     Total Protein 8.3 g/dL     Albumin 4.67 g/dL     ALT (SGPT) 67 U/L         AST (SGOT) 84 U/L     Alkaline Phosphatase 186 U/L     Total Bilirubin 0.3 mg/dL     eGFR Non African Amer 42 mL/min/1.73     Globulin 3.6 gm/dL     A/G Ratio 1.3 g/dL     BUN/Creatinine Ratio     Anion Gap 34.5 mmol/L    STAT Lactic Acid, Reflex [[id number]]  (Abnormal)    Specimen: Blood from Arm, Left     Lactate 3.9 mmol/L    Phosphorus [[id number]]  (Normal)    Specimen: Blood from Hand, Right     Phosphorus 3.8 mg/dL    Magnesium [[id number]]  (Normal)    Specimen: Blood from Hand, Right     Magnesium 1.9 mg/dL    Ethanol [[id number]]    Specimen: Blood from Hand, Right     Ethanol <10 mg/dL     Ethanol % <0.010 %          CBC Auto Differential [[id number]]  (Abnormal)    Specimen: Blood from Arm, Left     WBC 11.41 10*3/mm3     RBC 4.92 10*6/mm3     Hemoglobin 15.5 g/dL     Hematocrit 49.5 %     .6 fL     MCH 31.5 pg     MCHC 31.3 g/dL     RDW 14.3 %     RDW-SD 53.9 fl     MPV 9.8 fL     Platelets 396 10*3/mm3     Neutrophil % 87.1 %     Lymphocyte % 7.4 %     Monocyte % 4.6 %     Eosinophil % 0.1 %     Basophil % 0.3 %     Immature Grans % 0.5 %     Neutrophils, Absolute 9.94 10*3/mm3     Lymphocytes, Absolute 0.84 10*3/mm3     Monocytes, Absolute 0.53 10*3/mm3     Eosinophils, Absolute 0.01 10*3/mm3     Basophils, Absolute 0.03 10*3/mm3     Immature Grans, Absolute 0.06 10*3/mm3     nRBC 0.0 /100 WBC    COVID-19 and FLU A/B PCR - Swab, Nasopharynx [[id number]]  (Abnormal)    Specimen: Swab from Nasopharynx     COVID19     Influenza A PCR     Influenza B PCR    Lactic Acid, Plasma [[id number]]  (Abnormal)    Specimen: Blood from Hand, Right     Lactate 4.5 mmol/L    POC Glucose Once [[id number]]  (Abnormal)    Specimen: Blood     Glucose 567 mg/dL        hCG, Serum, Qualitative [[id number]]  (Normal)    Specimen: Blood from Hand, Right     HCG Qualitative    Blood Gas, Venous With Co-Ox [[id number]]  (Abnormal)     Specimen: Venous Blood     Site     pH, Venous 7.065 pH Units         pCO2, Venous 24.2 mm Hg         pO2, Venous 34.5 mm Hg     HCO3, Venous 6.9 mmol/L         Base Excess, Venous -21.8 mmol/L     O2 Saturation, Venous 55.2 %     Hemoglobin, Blood Gas 15.4 g/dL     CO2 Content 7.7 mmol/L     Temperature 37.0 C     Barometric Pressure for Blood Gas 724 mmHg     Modality     FIO2 21 %     Ventilator Mode     Notified Who     Notified By     Notified Time     Collected by         Oxyhemoglobin Venous 54.4 %     Carboxyhemoglobin Venous 0.8 %         Methemoglobin Venous 0.7 %    POC Glucose Once [[id number]]  (Abnormal)    Specimen: Blood     Glucose 544 mg/dL              Imaging Results (All)     Procedure Component Value Units Date/Time    CT Chest Pulmonary Embolism [[id number]] Collected: 01/18/22 2352     Updated: 01/18/22 2354    Narrative:      CT CHEST PULMONARY EMBOLISM W CONTRAST    INDICATION:   Shortness of air. Diabetic ketoacidosis. Covid positive.    TECHNIQUE:   CT angiogram of the chest with IV contrast. 3-D reconstructions were obtained and reviewed.   Radiation dose reduction techniques included automated exposure control or exposure modulation based on body size. Count of known CT and cardiac nuc med studies  performed in previous 12 months: 2.     COMPARISON:   CTA chest 1/8/2022    FINDINGS:   Adequate opacification of the pulmonary arteries with no filling defects. Thoracic aorta normal in course and caliber without dissection. Heart size is normal. No pericardial effusion.    No pleural effusion. No pneumothorax. Interval development of patchy groundglass infiltrates scattered throughout both lungs.    Visualized upper abdomen is unremarkable.    No acute osseous abnormality.      Impression:      1. Negative for pulmonary embolus.  2. Negative for thoracic aortic aneurysm/dissection.  3. Interval development of patchy groundglass infiltrates scattered throughout both lungs, consistent with  multifocal pneumonia. COVID-19 reporting criteria: Typical. Commonly reported imaging features of COVID-19 pneumonia are present. Other processes  such as influenza pneumonia and organizing pneumonia can cause a similar imaging pattern.    Signer Name: Jonathan Guerra MD   Signed: 1/18/2022 11:52 PM   Workstation Name: [id number]    [facility]    XR Chest 1 View [[id number]] Collected: 01/17/22 1704     Updated: 01/17/22 1717    Narrative:      EXAM:    XR Chest, 1 View     EXAM DATE:    1/17/2022 4:12 PM     CLINICAL HISTORY:    DKA     TECHNIQUE:    Frontal view of the chest.     COMPARISON:    01/08/2022     FINDINGS:    Lungs:  Unremarkable.  No consolidation.    Pleural space:  Unremarkable.  No pneumothorax.    Heart:  Unremarkable.  No cardiomegaly.    Mediastinum:  Unremarkable.    Bones/joints:  Unremarkable.       Impression:        Unremarkable exam. No acute cardiopulmonary findings identified.     This report was finalized on 1/17/2022 5:04 PM by Dr. Inocencio Smith MD.             Orders (all)      Start     Ordered    01/18/22 1555  Inpatient Admission  Once         01/18/22 1608    01/18/22 1555  Code Status and Medical Interventions:  Continuous         01/18/22 1608    01/17/22 1700  Vital Signs  Every Hour       01/17/22 1606    01/17/22 1700  Strict Intake & Output  Every Hour      Comments: While on insulin infusion.    01/17/22 1606    01/17/22 1607  Daily Weights  Daily       01/17/22 1606    01/17/22 1606  Continuous Pulse Oximetry  Continuous         01/17/22 1606    01/17/22 1606  NPO Diet  Diet Effective Now,   Status:  Canceled         01/17/22 1606    01/17/22 1606  Inpatient Nutrition Consult  Once        Provider:  (Not yet assigned)    01/17/22 1606    01/17/22 1606  Inpatient Diabetes Educator Consult  Once        Provider:  (Not yet assigned)    01/17/22 1606

## 2022-01-19 NOTE — PLAN OF CARE
Goal Outcome Evaluation:  Plan of Care Reviewed With: patient  Pt A&O, VSS, RA. Pt denies pain or sob this shift. POCT qh performed throughout shift. Fluids continue to infuse per MD order. Bed locked and in low position. Bedside table and call light in reach.

## 2022-01-19 NOTE — PLAN OF CARE
Goal Outcome Evaluation:   Pt vitals stable; remains off on insulin drip; probable discharge later.

## 2022-01-19 NOTE — PROGRESS NOTES
"    [facility] HOSPITALIST PROGRESS NOTE     Patient Identification:  Name:  Marta Hahn  Age:  36 y.o.  Sex:  female  :  1985  MRN:  [id number]  Visit Number:  [id number]  ROOM: [place] [address]     Primary Care Provider:  Paul Nava MD    Length of stay in inpatient status:  1    Subjective     Chief Compliant:    Chief Complaint   Patient presents with   • Blood Sugar Problem     History of Presenting Illness:    Patient remains ill but stable, no acute events overnight, no new complaints, tolerating diet, Glc trending up, titrating levemir today, replacing electrolytes, repeating Bcx's though likely contaminant, complained of chest \"congestion\" today and observing overnight as she still remains on room air, she denies any fevers or chills.   Objective     Current Hospital Meds:atorvastatin, 10 mg, Oral, Daily  enoxaparin, 40 mg, Subcutaneous, Q24H  insulin aspart, 0-9 Units, Subcutaneous, TID AC  insulin detemir, 15 Units, Subcutaneous, Daily  potassium phosphate infusion greater than 15 mMoles, 45 mmol, Intravenous, Once  sodium chloride, 10 mL, Intravenous, Q12H  sodium chloride, 3 mL, Intravenous, Q12H    custom IV infusion builder, , Last Rate: Stopped (22)  dextrose 5 % and sodium chloride 0.45 %, 150 mL/hr, Last Rate: Stopped (22)  dextrose 5 % and sodium chloride 0.45 % with KCl 20 mEq/L, 150 mL/hr  dextrose 5 % and sodium chloride 0.45 % with KCl 40 mEq/L, 150 mL/hr, Last Rate: Stopped (22 160)  dextrose 5 % and sodium chloride 0.9 %, 150 mL/hr  dextrose 5 % and sodium chloride 0.9 % with KCl 20 mEq, 150 mL/hr  dextrose 5% and sodium chloride 0.9% with KCl 40 mEq/L, 150 mL/hr  insulin, 7 Units/hr, Last Rate: Stopped (22)  custom IV KCl infusion builder, 250 mL/hr  sodium chloride, 250 mL/hr  sodium chloride 0.45 % with KCl 20 mEq, 250 mL/hr  sodium chloride, 250 mL/hr  sodium chloride, 10 mL/hr  sodium chloride, 75 mL/hr  sodium chloride 0.9 % " with KCl 20 mEq, 250 mL/hr  sodium chloride 0.9 % with KCl 40 mEq/L, 250 mL/hr        Current Antimicrobial Therapy:  Anti-Infectives (From admission, onward)    None        Current Diuretic Therapy:  Diuretics (From admission, onward)    None        ----------------------------------------------------------------------------------------------------------------------  Vital Signs:  Temp:  [97.2 °F (36.2 °C)-100.2 °F (37.9 °C)] 98.4 °F (36.9 °C)  Heart Rate:  [] (P) 98  Resp:  [10-34] (P) 12  BP: (102-129)/(62-88) (P) 135/84  SpO2:  [96 %-100 %] (P) 98 %  on   ;   Device (Oxygen Therapy): (P) room air  Body mass index is 22.76 kg/m².    Wt Readings from Last 3 Encounters:   01/19/22 64 kg (141 lb)   01/11/22 77.4 kg (170 lb 11.2 oz)     Intake & Output (last 3 days)       01/16 0701 01/17 0700 01/17 0701 01/18 0700 01/18 0701 01/19 0700 01/19 0701 01/20 0700    P.O.    430    I.V. (mL/kg)  26 (0.3)      IV Piggyback  1000 500     Total Intake(mL/kg)  1026 (13.3) 500 (7.8) 430 (6.7)    Net  +1026 +500 +430            Urine Unmeasured Occurrence   1 x 2 x        Diet Regular; Consistent Carbohydrate  ----------------------------------------------------------------------------------------------------------------------  Physical exam:  Constitutional:  Well-developed and well-nourished.  No acute distress.  HENT:  Head: Normocephalic and atraumatic.  Mouth:  Moist mucous membranes.    Eyes:  Conjunctivae and EOM are normal.  No scleral icterus.  Neck:  Neck supple.  No JVD present.    Cardiovascular:  Normal rate, regular rhythm and normal heart sounds with no murmur.  Pulmonary/Chest:  No respiratory distress, no wheezes, on room air satting 98%  Abdominal:  Soft.  Bowel sounds are normal.  No distension and no tenderness.   Musculoskeletal:  No tenderness, and no deformity.    Neurological:  Alert and oriented to person, place, and time.  No gross focal deficits  Skin:  Skin is warm and dry.  No rash noted.   No pallor.   Peripheral vascular:  No edema, no cyanosis  ----------------------------------------------------------------------------------------------------------------------  Results from last 7 days   Lab Units 01/19/22  0031 01/18/22  0656 01/17/22  1751 01/17/22  1640   LACTATE mmol/L  --  1.1 3.9* 4.5*   WBC 10*3/mm3 4.80 7.26 11.41*  --    HEMOGLOBIN g/dL 12.2 13.6 15.5  --    HEMATOCRIT % 35.8 40.2 49.5*  --    MCV fL 94.0 94.1 100.6*  --    MCHC g/dL 34.1 33.8 31.3*  --    PLATELETS 10*3/mm3 263 306 396  --          Results from last 7 days   Lab Units 01/19/22  1615 01/19/22  1128 01/19/22  0756 01/19/22  0352 01/19/22  0031 01/18/22  0402 01/17/22  1751   SODIUM mmol/L 133* 131* 133*   < > 134*   < > 129*   POTASSIUM mmol/L 4.1 3.5 3.5   < > 3.3*   < > 5.0   MAGNESIUM mg/dL 1.8 1.7 1.9   < > 1.7   < > 1.9   CHLORIDE mmol/L 100 99 100   < > 101   < > 89*   CO2 mmol/L 17.4* 17.8* 17.4*   < > 19.0*   < > 5.5*   BUN mg/dL 5* 6 6   < > 7   < > 19   CREATININE mg/dL 0.60 0.58 0.66   < > 0.70   < > 1.43*   EGFR IF NONAFRICN AM mL/min/1.73 114 118 102   < > 95   < > 42*   CALCIUM mg/dL 7.8* 7.8* 8.3*   < > 8.1*   < > 8.6   PHOSPHORUS mg/dL 2.9 1.0* 1.2*   < > 2.2*   < > 3.8   GLUCOSE mg/dL 195* 254* 259*   < > 161*   < > 555*   ALBUMIN g/dL  --   --   --   --  3.56  --  4.67   BILIRUBIN mg/dL  --   --   --   --  0.4  --  0.3   ALK PHOS U/L  --   --   --   --  136*  --  186*   AST (SGOT) U/L  --   --   --   --  56*  --  84*   ALT (SGPT) U/L  --   --   --   --  43*  --  67*    < > = values in this interval not displayed.   Estimated Creatinine Clearance: 132.2 mL/min (by C-G formula based on SCr of 0.6 mg/dL).  No results found for: AMMONIA  Results from last 7 days   Lab Units 01/17/22  1751   TROPONIN T ng/mL <0.010             Hemoglobin A1C   Date/Time Value Ref Range Status   01/17/2022 1751 9.00 (H) 4.80 - 5.60 % Final     Glucose   Date/Time Value Ref Range Status   01/19/2022 1649 166 (H) 70 - 130 mg/dL  Final     Comment:     Meter: [id number] : [id number] Sarai Kruger   01/19/2022 1122 242 (H) 70 - 130 mg/dL Final     Comment:     Meter: [id number] : [id number] DARLINE WEBSTER   01/19/2022 0635 180 (H) 70 - 130 mg/dL Final     Comment:     Meter: [id number] : [id number] Lilliana Tellez   01/19/2022 0527 176 (H) 70 - 130 mg/dL Final     Comment:     Meter: [id number] : [id number] Lilliana Tellez   01/19/2022 0426 174 (H) 70 - 130 mg/dL Final     Comment:     Meter: [id number] : [id number] Lilliana Tellez   01/19/2022 0331 173 (H) 70 - 130 mg/dL Final     Comment:     Meter: [id number] : [id number] Lilliana Tellez   01/19/2022 0221 200 (H) 70 - 130 mg/dL Final     Comment:     Meter: [id number] : [id number] Lilliana Tellez   01/19/2022 0113 163 (H) 70 - 130 mg/dL Final     Comment:     RN Notified Meter: [id number] : [id number] Villatoro Aissatou     No results found for: TSH, FREET4  Lab Results   Component Value Date    PREGTESTUR Negative 01/10/2022     Pain Management Panel    There is no flowsheet data to display.       Brief Urine Lab Results  (Last result in the past 365 days)      Color   Clarity   Blood   Leuk Est   Nitrite   Protein   CREAT   Urine HCG        01/10/22 1402               Negative           Blood Culture   Date Value Ref Range Status   01/17/2022 Staphylococcus, coagulase negative (C)  Final   01/17/2022 No growth at 2 days  Preliminary     No results found for: URINECX  No results found for: WOUNDCX  No results found for: STOOLCX  No results found for: RESPCX  No results found for: AFBCX  Results from last 7 days   Lab Units 01/18/22  0656 01/17/22  1751 01/17/22  1640   LACTATE mmol/L 1.1 3.9* 4.5*     I have personally looked at the labs and they are summarized above.  ----------------------------------------------------------------------------------------------------------------------  Detailed radiology reports for the last 24 hours:  Imaging Results (Last 24 Hours)     Procedure  Component Value Units Date/Time    US Venous Doppler Lower Extremity Bilateral (duplex) [[id number]] Collected: 01/19/22 1547     Updated: 01/19/22 1549    Narrative:      US VENOUS DOPPLER LOWER EXTREMITY BILATERAL (DUPLEX)-     CLINICAL INDICATION: Covid +; E10.10-Type 1 diabetes mellitus with  ketoacidosis without coma; E83.39-Other disorders of phosphorus  metabolism; U07.1-COVID-19        COMPARISON: None available      TECHNIQUE: Color Doppler imaging was used with compression and  augmentation to evaluate the lower extremity deep venous system.     FINDINGS:   There is patent spontaneous flow from the common femoral vein through  the posterior tibial veins.  There was no internal clot or area of noncompressibility.  Normal augmentation was elicited where applicable.       Impression:      No DVT in the lower extremities on today's exam.      This report was finalized on 1/19/2022 3:47 PM by Dr. Irineo Sen MD.       CT Chest Pulmonary Embolism [[id number]] Collected: 01/18/22 2352     Updated: 01/18/22 2354    Narrative:      CT CHEST PULMONARY EMBOLISM W CONTRAST    INDICATION:   Shortness of air. Diabetic ketoacidosis. Covid positive.    TECHNIQUE:   CT angiogram of the chest with IV contrast. 3-D reconstructions were obtained and reviewed.   Radiation dose reduction techniques included automated exposure control or exposure modulation based on body size. Count of known CT and cardiac nuc med studies  performed in previous 12 months: 2.     COMPARISON:   CTA chest 1/8/2022    FINDINGS:   Adequate opacification of the pulmonary arteries with no filling defects. Thoracic aorta normal in course and caliber without dissection. Heart size is normal. No pericardial effusion.    No pleural effusion. No pneumothorax. Interval development of patchy groundglass infiltrates scattered throughout both lungs.    Visualized upper abdomen is unremarkable.    No acute osseous abnormality.      Impression:      1. Negative  "for pulmonary embolus.  2. Negative for thoracic aortic aneurysm/dissection.  3. Interval development of patchy groundglass infiltrates scattered throughout both lungs, consistent with multifocal pneumonia. COVID-19 reporting criteria: Typical. Commonly reported imaging features of COVID-19 pneumonia are present. Other processes  such as influenza pneumonia and organizing pneumonia can cause a similar imaging pattern.    Signer Name: Jonathan Guerra MD   Signed: 1/18/2022 11:52 PM   Workstation Name: [email or web address]    [facility]        Assessment & Plan    36F PMH IDDM Type I, recently admitted this facility 1/10-11 for Sepsis, UTI, DKA, presents w/ nausea and vomiting, found to be again in DKA but also this time testing positive for Covid 19.     #DKA in setting of IDDM Type II, uncontrolled, unknown complications, recent Hx DKA  #Lactic Acidosis - Resolved  - Hgb A1c = 9.0%  - Patient presented w/ nausea, vomiting, increased glucose, gap open, underlying viral infection, recent Hx DKA this month.  - Admission labs showed Glc 555, AG 34, Bicarb 5.5, Acetone large, Bcx's pending  - CXR w/o acute cardiopulmonary findings.  - Continue Levemir, increased to 15U today, on 25U at home, titrate as needed, continue FSBG and SSI.  - Monitor in Pcu, monitor on telemetry, trend labs in AM    #Covid 19, PNA, Viral, not treating due to asymptomatic nature  - Venous duplex duplex and CTPE negative, CT did show viral PNA  - Does not meet criteria for Remdesivir or Steroids at this time  - Continue Level I AC for Covid 19 thromboembolism PPx  - APAP PRN for fevers if needed  - Trend Covid 19 progression labs w/ CBC, CMP, D-dimer daily   - Continue enhanced droplet/contact isolation precautions, monitor for need to initiate 02, patient did complain of chest \"congestion\" today and will continue to observe overnight.    #Electrolyte Abnormalities  - Borderline Hypomagnesemia - Mg 1.7, Replace per protocol  - " Pseudohyponatremia - Na 129 on admission, improving w/ treatment DKA, now 131, trending  - Acute Mild Hypophosphatemia - Phos 1.0, Replacing per protocol    #Non-Oliguric TAMAR 2/2 DKA - Resolved  - B/l Cr NML, was up to 1.4 on admission, now NML; Trend Cr and UOP, avoid nephrotoxins, NSAIDs, dehydration and contrast as able.    F: Oral  E: Monitor & Replace PRN  N: DM diet  Ppx: Plov (Level I AC)  Code: Full Code     Dispo: Pending clinical improvement     *This patient is considered high risk due to DKA, Covid 19, lactic acidosis, TAMAR.    VTE Prophylaxis:   Mechanical Order History:     None      Pharmalogical Order History:      Ordered     Dose Route Frequency Stop    01/18/22 2122  Pharmacy to Dose enoxaparin (LOVENOX)  Status:  Discontinued         -- XX Continuous PRN 01/18/22 2125 01/18/22 1751  enoxaparin (LOVENOX) syringe 40 mg         40 mg SC Every 24 Hours --              Santiago Jones MD  [facility] Hospitalist  01/19/22  17:16 EST

## 2022-01-20 ENCOUNTER — READMISSION MANAGEMENT (OUTPATIENT)
Dept: CALL CENTER | Facility: HOSPITAL | Age: 37
End: 2022-01-20

## 2022-01-20 VITALS
BODY MASS INDEX: 22.66 KG/M2 | HEIGHT: 66 IN | OXYGEN SATURATION: 98 % | WEIGHT: 141 LBS | TEMPERATURE: 98.5 F | DIASTOLIC BLOOD PRESSURE: 72 MMHG | HEART RATE: 91 BPM | RESPIRATION RATE: 18 BRPM | SYSTOLIC BLOOD PRESSURE: 115 MMHG

## 2022-01-20 LAB
ALBUMIN SERPL-MCNC: 2.86 G/DL (ref 3.5–5.2)
ALBUMIN/GLOB SERPL: 1.1 G/DL
ALP SERPL-CCNC: 131 U/L (ref 39–117)
ALT SERPL W P-5'-P-CCNC: 29 U/L (ref 1–33)
ANION GAP SERPL CALCULATED.3IONS-SCNC: 8.6 MMOL/L (ref 5–15)
AST SERPL-CCNC: 28 U/L (ref 1–32)
BASOPHILS # BLD AUTO: 0.02 10*3/MM3 (ref 0–0.2)
BASOPHILS NFR BLD AUTO: 0.6 % (ref 0–1.5)
BILIRUB SERPL-MCNC: 0.3 MG/DL (ref 0–1.2)
BUN SERPL-MCNC: 3 MG/DL (ref 6–20)
BUN/CREAT SERPL: 6.4 (ref 7–25)
CALCIUM SPEC-SCNC: 8 MG/DL (ref 8.6–10.5)
CHLORIDE SERPL-SCNC: 105 MMOL/L (ref 98–107)
CO2 SERPL-SCNC: 24.4 MMOL/L (ref 22–29)
CREAT SERPL-MCNC: 0.47 MG/DL (ref 0.57–1)
D DIMER PPP FEU-MCNC: 1.02 MCGFEU/ML (ref 0–0.5)
DEPRECATED RDW RBC AUTO: 50.4 FL (ref 37–54)
EOSINOPHIL # BLD AUTO: 0 10*3/MM3 (ref 0–0.4)
EOSINOPHIL NFR BLD AUTO: 0 % (ref 0.3–6.2)
ERYTHROCYTE [DISTWIDTH] IN BLOOD BY AUTOMATED COUNT: 14 % (ref 12.3–15.4)
GFR SERPL CREATININE-BSD FRML MDRD: >150 ML/MIN/1.73
GLOBULIN UR ELPH-MCNC: 2.5 GM/DL
GLUCOSE BLDC GLUCOMTR-MCNC: 103 MG/DL (ref 70–130)
GLUCOSE BLDC GLUCOMTR-MCNC: 155 MG/DL (ref 70–130)
GLUCOSE BLDC GLUCOMTR-MCNC: 158 MG/DL (ref 70–130)
GLUCOSE BLDC GLUCOMTR-MCNC: 45 MG/DL (ref 70–130)
GLUCOSE SERPL-MCNC: 38 MG/DL (ref 65–99)
HCT VFR BLD AUTO: 35.4 % (ref 34–46.6)
HGB BLD-MCNC: 11.5 G/DL (ref 12–15.9)
IMM GRANULOCYTES # BLD AUTO: 0.01 10*3/MM3 (ref 0–0.05)
IMM GRANULOCYTES NFR BLD AUTO: 0.3 % (ref 0–0.5)
LYMPHOCYTES # BLD AUTO: 1.22 10*3/MM3 (ref 0.7–3.1)
LYMPHOCYTES NFR BLD AUTO: 35.5 % (ref 19.6–45.3)
MCH RBC QN AUTO: 31.5 PG (ref 26.6–33)
MCHC RBC AUTO-ENTMCNC: 32.5 G/DL (ref 31.5–35.7)
MCV RBC AUTO: 97 FL (ref 79–97)
MONOCYTES # BLD AUTO: 0.55 10*3/MM3 (ref 0.1–0.9)
MONOCYTES NFR BLD AUTO: 16 % (ref 5–12)
NEUTROPHILS NFR BLD AUTO: 1.64 10*3/MM3 (ref 1.7–7)
NEUTROPHILS NFR BLD AUTO: 47.6 % (ref 42.7–76)
NRBC BLD AUTO-RTO: 0 /100 WBC (ref 0–0.2)
PLATELET # BLD AUTO: 167 10*3/MM3 (ref 140–450)
PMV BLD AUTO: 9.7 FL (ref 6–12)
POTASSIUM SERPL-SCNC: 3 MMOL/L (ref 3.5–5.2)
PROT SERPL-MCNC: 5.4 G/DL (ref 6–8.5)
RBC # BLD AUTO: 3.65 10*6/MM3 (ref 3.77–5.28)
SODIUM SERPL-SCNC: 138 MMOL/L (ref 136–145)
WBC NRBC COR # BLD: 3.44 10*3/MM3 (ref 3.4–10.8)

## 2022-01-20 PROCEDURE — 82962 GLUCOSE BLOOD TEST: CPT

## 2022-01-20 PROCEDURE — 80053 COMPREHEN METABOLIC PANEL: CPT | Performed by: INTERNAL MEDICINE

## 2022-01-20 PROCEDURE — 99239 HOSP IP/OBS DSCHRG MGMT >30: CPT | Performed by: INTERNAL MEDICINE

## 2022-01-20 PROCEDURE — 63710000001 INSULIN ASPART PER 5 UNITS: Performed by: INTERNAL MEDICINE

## 2022-01-20 PROCEDURE — 85379 FIBRIN DEGRADATION QUANT: CPT | Performed by: INTERNAL MEDICINE

## 2022-01-20 PROCEDURE — 85025 COMPLETE CBC W/AUTO DIFF WBC: CPT | Performed by: INTERNAL MEDICINE

## 2022-01-20 RX ORDER — INSULIN GLARGINE 100 [IU]/ML
10 INJECTION, SOLUTION SUBCUTANEOUS DAILY
Refills: 12
Start: 2022-01-20

## 2022-01-20 RX ORDER — POTASSIUM CHLORIDE 20 MEQ/1
20 TABLET, EXTENDED RELEASE ORAL EVERY 4 HOURS
Status: DISCONTINUED | OUTPATIENT
Start: 2022-01-20 | End: 2022-01-20 | Stop reason: HOSPADM

## 2022-01-20 RX ORDER — POTASSIUM CHLORIDE 750 MG/1
20 TABLET, FILM COATED, EXTENDED RELEASE ORAL 2 TIMES DAILY
Qty: 12 TABLET | Refills: 0 | Status: SHIPPED | OUTPATIENT
Start: 2022-01-20 | End: 2022-01-23

## 2022-01-20 RX ADMIN — ATORVASTATIN CALCIUM 10 MG: 10 TABLET, FILM COATED ORAL at 09:01

## 2022-01-20 RX ADMIN — SODIUM CHLORIDE, PRESERVATIVE FREE 10 ML: 5 INJECTION INTRAVENOUS at 09:02

## 2022-01-20 RX ADMIN — POTASSIUM CHLORIDE 20 MEQ: 1500 TABLET, EXTENDED RELEASE ORAL at 09:01

## 2022-01-20 RX ADMIN — INSULIN ASPART 2 UNITS: 100 INJECTION, SOLUTION INTRAVENOUS; SUBCUTANEOUS at 09:01

## 2022-01-20 RX ADMIN — DEXTROSE MONOHYDRATE 25 G: 25 INJECTION, SOLUTION INTRAVENOUS at 03:10

## 2022-01-20 NOTE — PAYOR COMM NOTE
"CONTACT:  HAMMAD ROTHMAN MSN, APRN  UTILIZATION MANAGEMENT DEPT.  [facility]  [address] [facility], [address]  PHONE:  [phone]  FAX: [phone]    PATIENT DISCHARGED TO HOME ON 22, AWAITING AUTHORIZATION.    Mary Jo Hahn (36 y.o. Female)             Date of Birth Social Security Number Address Home Phone MRN    1985  [address]  [facility] [address] [phone] [id number]    Latter day Marital Status             None Single       Admission Date Admission Type Admitting Provider Attending Provider Department, Room/Bed    22 Emergency Santiago Jones MD  [facility] PROGRESS CARE, P204/S2    Discharge Date Discharge Disposition Discharge Destination          2022 Home or Self Care              Attending Provider: (none)   Allergies: No Known Allergies    Isolation: Enh Drop/Con   Infection: COVID (confirmed) (22)   Code Status: CPR   Advance Care Planning Activity    Ht: 167.6 cm (66\")   Wt: 64 kg (141 lb)    Admission Cmt: None   Principal Problem: None                Active Insurance as of 2022     Primary Coverage     Payor Plan Insurance Group Employer/Plan Group    [employer] [place] [employer] [place]ICA[place]      Payor Plan Address Payor Plan Phone Number Payor Plan Fax Number Effective Dates    [address] [address] [phone]  2022 - None Entered    [facility] [id number]       Subscriber Name Subscriber Birth Date Member ID       MARY JO HAHN [id number]                 Emergency Contacts      (Rel.) Home Phone Work Phone Mobile Phone    LISBET HAHN (Mother) [phone] -- --               Discharge Summary      Santiago Jones MD at 22 0908              [facility] HOSPITALISTS DISCHARGE SUMMARY    Patient Identification:  Name:  Mary Jo Hahn  Age:  36 y.o.  Sex:  female  :  1985  MRN:  [id number]  Visit Number:  [id number]    Date of Admission: 2022  Date of Discharge:  2022     PCP: " Paul Nava MD    DISCHARGE DIAGNOSIS  DKA - Resolved  Non-Oliguric TAMAR 2/2 DKA - Resolved  Lactic Acidosis - Resolved  Covid 19, PNA, Viral, not treating due to asymptomatic nature  + Bcx's, suspected contaminant   + D-dimer  IDDM Type II, uncontrolled, unknown complications, recent Hx DKA  Borderline Hypomagnesemia  Pseudohyponatremia - Resolved  Acute Mild Hypophosphatemia - Resolved  Acute Mild Hypokalemia     CONSULTS   None    PROCEDURES PERFORMED  None    HOSPITAL COURSE  36F PMH IDDM Type I, recently admitted this facility 1/10-11 for Sepsis, UTI, DKA, presents w/ nausea and vomiting, found to be again in DKA but also this time testing positive for Covid 19.     #DKA in setting of IDDM Type II, uncontrolled, unknown complications, recent Hx DKA - DKA Resolved  #Lactic Acidosis - Resolved  #+ Bcx's, suspected contaminant   Patient presented w/ nausea, vomiting, increased glucose, gap open, underlying viral infection, recent Hx DKA this month.  Admission labs showed Hgb A1c 9.0%, Glc 555, AG 34, Bicarb 5.5, Acetone large, Bcx's 1 of 2 positive staph not aureus but suspected contaminant, repeat pending. CXR w/o acute cardiopulmonary findings.  Patient was treated per DKA protocol, she was transitioned to SQ insulin at lower than home dose 15U but had hypoglycemia overnight and have adjusted to 10U upon discharge, instructed patient to take around dinner time today as she takes at nightime at home and had been receiving in AM here.  She will continue on SSI at home.  I have instructed her to titrate up to home insulin as she is able and as her appetite improves.  She will f/u w/ her PCP in 1 week for further adjustments.     #Covid 19, PNA, Viral, not treating due to asymptomatic nature  #+ D-dimer  Venous duplex duplex and CTPE negative, CT did show viral PNA.  Patient did not meet criteria for treatment.  She did not require 02 but will send home w/ home pulse ox.  She continues to sat 98% or higher on  room air.  Will send home w/ low dose eliquis x 7 days for DVT PPx.    #Electrolyte Abnormalities  Borderline Hypomagnesemia - Mg 1.7, Replaced per protocol  Pseudohyponatremia - Na 129 on admission, improving w/ treatment DKA, now NML  Acute Mild Hypophosphatemia - Phos 1.0, Replaced per protocol, now NML  Acute Mild Hypokalemia - K 3.0 today, replaced per protocol, send home w/ PO K x 3 days.    #Non-Oliguric TAMAR 2/2 DKA - Resolved  B/l Cr NML, was up to 1.4 on admission, now NML; Trended Cr and UOP, avoided nephrotoxins, NSAIDs, dehydration and contrast as able.  Repeat BMP per PCP at f/u appointment.     VITAL SIGNS:  Temp:  [98.4 °F (36.9 °C)-98.8 °F (37.1 °C)] 98.5 °F (36.9 °C)  Heart Rate:  [] 84  Resp:  [10-23] 20  BP: (114-135)/(69-87) 114/74  SpO2:  [96 %-99 %] 98 %  on   ;   Device (Oxygen Therapy): room air    Body mass index is 22.76 kg/m².  Wt Readings from Last 3 Encounters:   01/20/22 64 kg (141 lb)   01/11/22 77.4 kg (170 lb 11.2 oz)     PHYSICAL EXAM:  Constitutional:  Well-developed and well-nourished.  No respiratory distress.      HENT:  Head:  Normocephalic and atraumatic.  Mouth:  Moist mucous membranes.    Eyes:  Conjunctivae and EOM are normal.  No scleral icterus.    Neck:  Neck supple.  No JVD present.    Cardiovascular:  Normal rate, regular rhythm and normal heart sounds with no murmur.  Pulmonary/Chest:  No respiratory distress, no wheezes, on room air.  Abdominal:  Soft. No distension and no tenderness.   Musculoskeletal:  No tenderness, and no deformity.     Neurological:  Alert and oriented to person, place, and time.  No gross focal deficits  Skin:  Skin is warm and dry. No rash noted. No pallor.   Peripheral vascular: no clubbing, no cyanosis, no edema.    DISCHARGE DISPOSITION   Stable    DISCHARGE MEDICATIONS:     Discharge Medications      New Medications      Instructions Start Date   apixaban 2.5 MG tablet tablet  Commonly known as: ELIQUIS   2.5 mg, Oral, Every 12 Hours  Scheduled      potassium chloride 10 MEQ CR tablet   20 mEq, Oral, 2 Times Daily         Changes to Medications      Instructions Start Date   insulin glargine 100 UNIT/ML injection  Commonly known as: LANTUS, SEMGLEE  What changed: how much to take   10 Units, Subcutaneous, Daily         Continue These Medications      Instructions Start Date   atorvastatin 10 MG tablet  Commonly known as: LIPITOR   10 mg, Oral, Daily      insulin lispro 100 UNIT/ML injection  Commonly known as: humaLOG   15-20 Units, Subcutaneous, 3 Times Daily Before Meals             Activity Instructions     Activity as Tolerated          Additional Instructions for the Follow-ups that You Need to Schedule     Discharge Follow-up with PCP   As directed       Currently Documented PCP:    Paul Nava MD    PCP Phone Number:    [phone]     Follow Up Details: 1 week post hospital discharge, will need insulin adjusted            Follow-up Information     Paul Nava MD .    Specialty: Endocrinology  Why: 1 week post hospital discharge, will need insulin adjusted  Contact information:  [address] [place] [address]  [phone]                        TEST  RESULTS PENDING AT DISCHARGE  Pending Labs     Order Current Status    Blood Culture - Blood, Hand, Left In process    Blood Culture - Blood, Hand, Right In process    Blood Culture - Blood, Hand, Right Preliminary result        CODE STATUS  Code Status and Medical Interventions:   Ordered at: 01/18/22 1608     Code Status (Patient has no pulse and is not breathing):    CPR (Attempt to Resuscitate)     Medical Interventions (Patient has pulse or is breathing):    Full Support     Santiago Jones MD  [facility]  01/20/22  09:08 EST    Please note that this discharge summary required more than 30 minutes to complete.      Electronically signed by Santiago Jones MD at 01/20/22 3690

## 2022-01-20 NOTE — OUTREACH NOTE
Prep Survey      Responses   [facility]ism facility patient discharged from? [place]   Is LACE score < 7 ? No   Emergency Room discharge w/ pulse ox? No   Eligibility Readm Mgmt   Discharge diagnosis Covid 19, PNA, Viral, not treating due to asymptomatic nature DKA - Resolved   Does the patient have one of the following disease processes/diagnoses(primary or secondary)? COVID-19   Does the patient have Home health ordered? No   Is there a DME ordered? No   Prep survey completed? Yes          [place]ley [place] RN

## 2022-01-20 NOTE — DISCHARGE INSTR - APPOINTMENTS
Feb. 4, 2022 1:45                                                                                         [address] [facility]  Follow up With Paul CASAREZ [address]  Phone Visit                                                                                                   [phone]

## 2022-01-20 NOTE — DISCHARGE SUMMARY
[facility]    Patient Identification:  Name:  Marta Hahn  Age:  36 y.o.  Sex:  female  :  1985  MRN:  [id number]  Visit Number:  [id number]    Date of Admission: 2022  Date of Discharge:  2022     PCP: Paul Nava MD    DISCHARGE DIAGNOSIS  DKA - Resolved  Non-Oliguric TAMAR 2/2 DKA - Resolved  Lactic Acidosis - Resolved  Covid 19, PNA, Viral, not treating due to asymptomatic nature  + Bcx's, suspected contaminant   + D-dimer  IDDM Type II, uncontrolled, unknown complications, recent Hx DKA  Borderline Hypomagnesemia  Pseudohyponatremia - Resolved  Acute Mild Hypophosphatemia - Resolved  Acute Mild Hypokalemia     CONSULTS   None    PROCEDURES PERFORMED  None    HOSPITAL COURSE  36F PMH IDDM Type I, recently admitted this facility 1/10- for Sepsis, UTI, DKA, presents w/ nausea and vomiting, found to be again in DKA but also this time testing positive for Covid 19.     #DKA in setting of IDDM Type II, uncontrolled, unknown complications, recent Hx DKA - DKA Resolved  #Lactic Acidosis - Resolved  #+ Bcx's, suspected contaminant   Patient presented w/ nausea, vomiting, increased glucose, gap open, underlying viral infection, recent Hx DKA this month.  Admission labs showed Hgb A1c 9.0%, Glc 555, AG 34, Bicarb 5.5, Acetone large, Bcx's 1 of 2 positive staph not aureus but suspected contaminant, repeat pending. CXR w/o acute cardiopulmonary findings.  Patient was treated per DKA protocol, she was transitioned to SQ insulin at lower than home dose 15U but had hypoglycemia overnight and have adjusted to 10U upon discharge, instructed patient to take around dinner time today as she takes at nightime at home and had been receiving in AM here.  She will continue on SSI at home.  I have instructed her to titrate up to home insulin as she is able and as her appetite improves.  She will f/u w/ her PCP in 1 week for further adjustments.     #Covid 19, PNA,  Viral, not treating due to asymptomatic nature  #+ D-dimer  Venous duplex duplex and CTPE negative, CT did show viral PNA.  Patient did not meet criteria for treatment.  She did not require 02 but will send home w/ home pulse ox.  She continues to sat 98% or higher on room air.  Will send home w/ low dose eliquis x 7 days for DVT PPx.    #Electrolyte Abnormalities  Borderline Hypomagnesemia - Mg 1.7, Replaced per protocol  Pseudohyponatremia - Na 129 on admission, improving w/ treatment DKA, now NML  Acute Mild Hypophosphatemia - Phos 1.0, Replaced per protocol, now NML  Acute Mild Hypokalemia - K 3.0 today, replaced per protocol, send home w/ PO K x 3 days.    #Non-Oliguric TAMAR 2/2 DKA - Resolved  B/l Cr NML, was up to 1.4 on admission, now NML; Trended Cr and UOP, avoided nephrotoxins, NSAIDs, dehydration and contrast as able.  Repeat BMP per PCP at f/u appointment.     VITAL SIGNS:  Temp:  [98.4 °F (36.9 °C)-98.8 °F (37.1 °C)] 98.5 °F (36.9 °C)  Heart Rate:  [] 84  Resp:  [10-23] 20  BP: (114-135)/(69-87) 114/74  SpO2:  [96 %-99 %] 98 %  on   ;   Device (Oxygen Therapy): room air    Body mass index is 22.76 kg/m².  Wt Readings from Last 3 Encounters:   01/20/22 64 kg (141 lb)   01/11/22 77.4 kg (170 lb 11.2 oz)     PHYSICAL EXAM:  Constitutional:  Well-developed and well-nourished.  No respiratory distress.      HENT:  Head:  Normocephalic and atraumatic.  Mouth:  Moist mucous membranes.    Eyes:  Conjunctivae and EOM are normal.  No scleral icterus.    Neck:  Neck supple.  No JVD present.    Cardiovascular:  Normal rate, regular rhythm and normal heart sounds with no murmur.  Pulmonary/Chest:  No respiratory distress, no wheezes, on room air.  Abdominal:  Soft. No distension and no tenderness.   Musculoskeletal:  No tenderness, and no deformity.     Neurological:  Alert and oriented to person, place, and time.  No gross focal deficits  Skin:  Skin is warm and dry. No rash noted. No pallor.   Peripheral  vascular: no clubbing, no cyanosis, no edema.    DISCHARGE DISPOSITION   Stable    DISCHARGE MEDICATIONS:     Discharge Medications      New Medications      Instructions Start Date   apixaban 2.5 MG tablet tablet  Commonly known as: ELIQUIS   2.5 mg, Oral, Every 12 Hours Scheduled      potassium chloride 10 MEQ CR tablet   20 mEq, Oral, 2 Times Daily         Changes to Medications      Instructions Start Date   insulin glargine 100 UNIT/ML injection  Commonly known as: LANTUS, SEMGLEE  What changed: how much to take   10 Units, Subcutaneous, Daily         Continue These Medications      Instructions Start Date   atorvastatin 10 MG tablet  Commonly known as: LIPITOR   10 mg, Oral, Daily      insulin lispro 100 UNIT/ML injection  Commonly known as: humaLOG   15-20 Units, Subcutaneous, 3 Times Daily Before Meals             Activity Instructions     Activity as Tolerated          Additional Instructions for the Follow-ups that You Need to Schedule     Discharge Follow-up with PCP   As directed       Currently Documented PCP:    Paul Nava MD    PCP Phone Number:    [phone]     Follow Up Details: 1 week post hospital discharge, will need insulin adjusted            Follow-up Information     Paul Nava MD .    Specialty: Endocrinology  Why: 1 week post hospital discharge, will need insulin adjusted  Contact information:  [address] [place] [address]  [phone]                        TEST  RESULTS PENDING AT DISCHARGE  Pending Labs     Order Current Status    Blood Culture - Blood, Hand, Left In process    Blood Culture - Blood, Hand, Right In process    Blood Culture - Blood, Hand, Right Preliminary result        CODE STATUS  Code Status and Medical Interventions:   Ordered at: 01/18/22 1608     Code Status (Patient has no pulse and is not breathing):    CPR (Attempt to Resuscitate)     Medical Interventions (Patient has pulse or is breathing):    Full Support     Santiago Jones  MD  [facility]  01/20/22  09:08 EST    Please note that this discharge summary required more than 30 minutes to complete.

## 2022-01-20 NOTE — PLAN OF CARE
Goal Outcome Evaluation:           Progress: improving  Outcome Summary: Pt denies any needs at this time. Remains on RA with no distress. VSS. Will continue to monitor.

## 2022-01-21 ENCOUNTER — READMISSION MANAGEMENT (OUTPATIENT)
Dept: CALL CENTER | Facility: HOSPITAL | Age: 37
End: 2022-01-21

## 2022-01-21 NOTE — OUTREACH NOTE
COVID-19 Week 1 Survey      Responses   [facility] [place] patient discharged from? [facility]   Does the patient have one of the following disease processes/diagnoses(primary or secondary)? COVID-19   COVID-19 underlying condition? None   Call Number Call 1   Week 1 Call successful? Yes   Call start time 1420   Call end time 1423   Is patient permission given to speak with other caregiver? Yes   Person spoke with today (if not patient) and relationship Tarah-mother   Meds reviewed with patient/caregiver? Yes   Is the patient having any side effects they believe may be caused by any medication additions or changes? No   Does the patient have all medications ordered at discharge? Yes   Is the patient taking all medications as directed (includes completed medication regime)? Yes   COVID-19 call completed? Yes   Wrap up additional comments Brief call with mother of patient- has talked with patient today, and patient is improving.  has everything that she needs, and will let her know that  Nurse Call Center will be calling her again tomorrow.          Yvette Gupta RN

## 2022-01-22 ENCOUNTER — READMISSION MANAGEMENT (OUTPATIENT)
Dept: CALL CENTER | Facility: HOSPITAL | Age: 37
End: 2022-01-22

## 2022-01-22 LAB — BACTERIA SPEC AEROBE CULT: NORMAL

## 2022-01-22 NOTE — OUTREACH NOTE
COVID-19 Week 1 Survey      Responses   [facility] patient discharged from? [facility]   Does the patient have one of the following disease processes/diagnoses(primary or secondary)? COVID-19   COVID-19 underlying condition? None   Call Number Call 2   Week 1 Call successful? No   Discharge diagnosis Covid 19, PNA, Viral, not treating due to asymptomatic nature DKA - Resolved          Hali Cummings, RN

## 2022-01-23 ENCOUNTER — READMISSION MANAGEMENT (OUTPATIENT)
Dept: CALL CENTER | Facility: HOSPITAL | Age: 37
End: 2022-01-23

## 2022-01-23 NOTE — OUTREACH NOTE
COVID-19 Week 1 Survey      Responses   [facility] patient discharged from? [facility]   Does the patient have one of the following disease processes/diagnoses(primary or secondary)? COVID-19   COVID-19 underlying condition? None   Call Number Call 3   Week 1 Call successful? Yes   Call start time 1118   Call end time 1120   Discharge diagnosis Covid 19, PNA, Viral, not treating due to asymptomatic nature DKA - Resolved   Is patient permission given to speak with other caregiver? Yes   Meds reviewed with patient/caregiver? Yes   Is the patient having any side effects they believe may be caused by any medication additions or changes? No   Does the patient have all medications ordered at discharge? Yes   Is the patient taking all medications as directed (includes completed medication regime)? Yes   Does the patient have a primary care provider?  Yes   Does the patient have an appointment with their PCP or specialist within 7 days of discharge? Yes   Has the patient kept scheduled appointments due by today? N/A   Comments PCP on the 4th.   Has home health visited the patient within 72 hours of discharge? N/A   Psychosocial issues? No   Did the patient receive a copy of their discharge instructions? Yes   Did the patient receive a copy of COVID-19 specific instructions? Yes   Nursing interventions Reviewed instructions with patient   What is the patient's perception of their health status since discharge? Improving   Does the patient have any of the following symptoms? Cough   Nursing Interventions Nurse provided patient education   Pulse Ox monitoring None   Is the patient/caregiver able to teach back steps to recovery at home? Set small, achievable goals for return to baseline health,  Rest and rebuild strength, gradually increase activity,  Eat a well-balance diet,  Make a list of questions for provider's appointment   If the patient is a current smoker, are they able to teach back resources for cessation? Not a  smoker   Is the patient/caregiver able to teach back the hierarchy of who to call/visit for symptoms/problems? PCP, Specialist, Home health nurse, Urgent Care, ED, 911 Yes   COVID-19 call completed? Yes   Wrap up additional comments Says BS is going ok, able to keep in range.          Flavia Gutierrez RN

## 2022-01-24 LAB
BACTERIA SPEC AEROBE CULT: NORMAL
BACTERIA SPEC AEROBE CULT: NORMAL

## 2022-01-27 ENCOUNTER — READMISSION MANAGEMENT (OUTPATIENT)
Dept: CALL CENTER | Facility: HOSPITAL | Age: 37
End: 2022-01-27

## 2022-01-27 NOTE — OUTREACH NOTE
COVID-19 Week 2 Survey      Responses   [facility] patient discharged from? [facility]   Does the patient have one of the following disease processes/diagnoses(primary or secondary)? COVID-19   COVID-19 underlying condition? None   Call Number Call 1   COVID-19 Week 2: Call 1 attempt successful? Yes   Call start time 0754  [She is asleep. The person we can speak with is not home..]   Call end time 0755   Discharge diagnosis Covid 19, PNA, Viral, not treating due to asymptomatic nature DKA - Resolved   Is patient permission given to speak with other caregiver? Yes   Person spoke with today (if not patient) and relationship Tarah-mother          Paola Smith RN

## 2022-02-03 ENCOUNTER — READMISSION MANAGEMENT (OUTPATIENT)
Dept: CALL CENTER | Facility: HOSPITAL | Age: 37
End: 2022-02-03

## 2022-02-03 NOTE — OUTREACH NOTE
COVID-19 Week 3 Survey      Responses   [facility] patient discharged from? [facility]   Does the patient have one of the following disease processes/diagnoses(primary or secondary)? COVID-19   COVID-19 underlying condition? None   Call Number Call 1   COVID-19 Week 3: Call 1 attempt successful? Yes   Call start time 0947   Call end time 0948   Discharge diagnosis Covid 19, PNA, Viral, not treating due to asymptomatic nature DKA - Resolved   Is patient permission given to speak with other caregiver? Yes   List who call center can speak with Tarah swenson    Person spoke with today (if not patient) and relationship Tarah swenson    Meds reviewed with patient/caregiver? Yes   Has the patient kept scheduled appointments due by today? N/A   Comments PCP on the 4th.   What is the patient's perception of their health status since discharge? Improving   Does the patient have any of the following symptoms? None   COVID-19 call completed? Yes   Revoked No further contact(revokes)-requires comment   Is the patient interested in additional calls from an ambulatory ?  NOTE:  applies to high risk patients requiring additional follow-up. No   Graduated/Revoked comments mother says she is doing fine and has no complaints          Bhakti Baraajs RN